# Patient Record
Sex: FEMALE | Race: WHITE | NOT HISPANIC OR LATINO | Employment: OTHER | URBAN - NONMETROPOLITAN AREA
[De-identification: names, ages, dates, MRNs, and addresses within clinical notes are randomized per-mention and may not be internally consistent; named-entity substitution may affect disease eponyms.]

---

## 2018-02-12 ENCOUNTER — OFFICE VISIT CONVERTED (OUTPATIENT)
Dept: FAMILY MEDICINE CLINIC | Age: 83
End: 2018-02-12
Attending: FAMILY MEDICINE

## 2018-09-12 ENCOUNTER — OFFICE VISIT CONVERTED (OUTPATIENT)
Dept: FAMILY MEDICINE CLINIC | Age: 83
End: 2018-09-12
Attending: FAMILY MEDICINE

## 2019-04-23 ENCOUNTER — OFFICE VISIT CONVERTED (OUTPATIENT)
Dept: FAMILY MEDICINE CLINIC | Age: 84
End: 2019-04-23
Attending: FAMILY MEDICINE

## 2019-11-20 ENCOUNTER — OFFICE VISIT CONVERTED (OUTPATIENT)
Dept: FAMILY MEDICINE CLINIC | Age: 84
End: 2019-11-20
Attending: FAMILY MEDICINE

## 2020-01-01 ENCOUNTER — OFFICE VISIT CONVERTED (OUTPATIENT)
Dept: FAMILY MEDICINE CLINIC | Age: 85
End: 2020-01-01
Attending: FAMILY MEDICINE

## 2020-01-01 ENCOUNTER — CONVERSION ENCOUNTER (OUTPATIENT)
Dept: NEUROLOGY | Facility: CLINIC | Age: 85
End: 2020-01-01

## 2020-01-01 ENCOUNTER — HOSPITAL ENCOUNTER (OUTPATIENT)
Dept: OTHER | Facility: HOSPITAL | Age: 85
Discharge: HOME | End: 2020-07-17
Attending: FAMILY MEDICINE

## 2020-01-01 ENCOUNTER — OFFICE VISIT CONVERTED (OUTPATIENT)
Dept: NEUROLOGY | Facility: CLINIC | Age: 85
End: 2020-01-01
Attending: NURSE PRACTITIONER

## 2020-01-07 ENCOUNTER — HOSPITAL ENCOUNTER (OUTPATIENT)
Dept: OTHER | Facility: HOSPITAL | Age: 85
Discharge: HOME | End: 2020-01-07
Attending: FAMILY MEDICINE

## 2020-01-07 LAB
ALBUMIN SERPL-MCNC: 3.9 G/DL (ref 3.5–5)
ALBUMIN/GLOB SERPL: 1.3 {RATIO} (ref 1.4–2.6)
ALP SERPL-CCNC: 70 U/L (ref 38–185)
ALT SERPL-CCNC: 17 U/L (ref 10–40)
ANION GAP SERPL CALC-SCNC: 16 MMOL/L (ref 8–19)
APPEARANCE UR: CLEAR
AST SERPL-CCNC: 29 U/L (ref 15–50)
BACTERIA UR QL AUTO: ABNORMAL
BILIRUB SERPL-MCNC: 0.4 MG/DL (ref 0.2–1.3)
BILIRUB UR QL: NEGATIVE
BNP SERPL-MCNC: 238 PG/ML (ref 0–1800)
BUN SERPL-MCNC: 12 MG/DL (ref 5–25)
BUN/CREAT SERPL: 17 {RATIO} (ref 6–20)
CALCIUM SERPL-MCNC: 9.4 MG/DL (ref 8.7–10.4)
CASTS URNS QL MICRO: ABNORMAL /[LPF]
CHLORIDE SERPL-SCNC: 100 MMOL/L (ref 99–111)
CK SERPL-CCNC: 48 U/L (ref 35–230)
COLOR UR: YELLOW
CONV CO2: 27 MMOL/L (ref 22–32)
CONV LEUKOCYTE ESTERASE: ABNORMAL
CONV TOTAL PROTEIN: 6.9 G/DL (ref 6.3–8.2)
CONV UROBILINOGEN IN URINE BY AUTOMATED TEST STRIP: 0.2 {EHRLICHU}/DL (ref 0.1–1)
CREAT UR-MCNC: 0.7 MG/DL (ref 0.5–0.9)
EPI CELLS #/AREA URNS HPF: ABNORMAL /[HPF]
ERYTHROCYTE [DISTWIDTH] IN BLOOD BY AUTOMATED COUNT: 12.8 % (ref 11.5–14.5)
GFR SERPLBLD BASED ON 1.73 SQ M-ARVRAT: >60 ML/MIN/{1.73_M2}
GLOBULIN UR ELPH-MCNC: 3 G/DL (ref 2–3.5)
GLUCOSE 24H UR-MCNC: NEGATIVE MG/DL
GLUCOSE SERPL-MCNC: 107 MG/DL (ref 65–99)
HBA1C MFR BLD: 13.4 G/DL (ref 12–16)
HCT VFR BLD AUTO: 40.8 % (ref 37–47)
HGB UR QL STRIP: ABNORMAL
KETONES UR QL STRIP: NEGATIVE MG/DL
MCH RBC QN AUTO: 30.8 PG (ref 27–31)
MCHC RBC AUTO-ENTMCNC: 32.8 G/DL (ref 33–37)
MCV RBC AUTO: 93.8 FL (ref 81–99)
MUCOUS THREADS URNS QL MICRO: ABNORMAL
NITRITE UR-MCNC: NEGATIVE MG/ML
OSMOLALITY SERPL CALC.SUM OF ELEC: 288 MOSM/KG (ref 273–304)
PH UR STRIP.AUTO: 6 [PH] (ref 5–8)
PLATELET # BLD AUTO: 144 10*3/UL (ref 130–400)
PMV BLD AUTO: 10.7 FL (ref 7.4–10.4)
POTASSIUM SERPL-SCNC: 4.1 MMOL/L (ref 3.5–5.3)
PROT UR-MCNC: NEGATIVE MG/DL
RBC # BLD AUTO: 4.35 10*6/UL (ref 4.2–5.4)
RBC # BLD AUTO: ABNORMAL /[HPF]
SODIUM SERPL-SCNC: 139 MMOL/L (ref 135–147)
SP GR UR STRIP: <=1.005 (ref 1–1.03)
SPECIMEN SOURCE: ABNORMAL
T4 FREE SERPL-MCNC: 1.2 NG/DL (ref 0.9–1.8)
TSH SERPL-ACNC: 4.31 M[IU]/L (ref 0.27–4.2)
UNIDENT CRYS URNS QL MICRO: ABNORMAL /[HPF]
WBC # BLD AUTO: 4.43 10*3/UL (ref 4.8–10.8)
WBC #/AREA URNS HPF: ABNORMAL /[HPF]

## 2021-01-01 ENCOUNTER — OFFICE VISIT CONVERTED (OUTPATIENT)
Dept: FAMILY MEDICINE CLINIC | Age: 86
End: 2021-01-01
Attending: FAMILY MEDICINE

## 2021-01-01 ENCOUNTER — OFFICE VISIT CONVERTED (OUTPATIENT)
Dept: NEUROLOGY | Facility: CLINIC | Age: 86
End: 2021-01-01
Attending: NURSE PRACTITIONER

## 2021-01-01 ENCOUNTER — APPOINTMENT (OUTPATIENT)
Dept: NEUROLOGY | Facility: HOSPITAL | Age: 86
End: 2021-01-01

## 2021-01-01 ENCOUNTER — HOSPITAL ENCOUNTER (INPATIENT)
Facility: HOSPITAL | Age: 86
LOS: 9 days | End: 2021-03-14
Attending: INTERNAL MEDICINE | Admitting: INTERNAL MEDICINE

## 2021-01-01 ENCOUNTER — HOSPITAL ENCOUNTER (INPATIENT)
Facility: HOSPITAL | Age: 86
LOS: 2 days | Discharge: HOSPICE | End: 2021-03-05
Attending: STUDENT IN AN ORGANIZED HEALTH CARE EDUCATION/TRAINING PROGRAM | Admitting: INTERNAL MEDICINE

## 2021-01-01 ENCOUNTER — APPOINTMENT (OUTPATIENT)
Dept: GENERAL RADIOLOGY | Facility: HOSPITAL | Age: 86
End: 2021-01-01

## 2021-01-01 ENCOUNTER — CONVERSION ENCOUNTER (OUTPATIENT)
Dept: OTHER | Facility: HOSPITAL | Age: 86
End: 2021-01-01

## 2021-01-01 VITALS
SYSTOLIC BLOOD PRESSURE: 161 MMHG | RESPIRATION RATE: 12 BRPM | DIASTOLIC BLOOD PRESSURE: 58 MMHG | OXYGEN SATURATION: 97 % | TEMPERATURE: 96.9 F | HEART RATE: 62 BPM | WEIGHT: 152.78 LBS

## 2021-01-01 VITALS — SYSTOLIC BLOOD PRESSURE: 83 MMHG | OXYGEN SATURATION: 96 % | DIASTOLIC BLOOD PRESSURE: 59 MMHG | TEMPERATURE: 99.3 F

## 2021-01-01 LAB
ALBUMIN SERPL-MCNC: 3.3 G/DL (ref 3.5–5.2)
ALBUMIN SERPL-MCNC: 3.8 G/DL (ref 3.5–5.2)
ALBUMIN/GLOB SERPL: 1.3 G/DL
ALBUMIN/GLOB SERPL: 1.3 G/DL
ALP SERPL-CCNC: 78 U/L (ref 39–117)
ALP SERPL-CCNC: 89 U/L (ref 39–117)
ALT SERPL W P-5'-P-CCNC: 49 U/L (ref 1–33)
ALT SERPL W P-5'-P-CCNC: 51 U/L (ref 1–33)
AMMONIA BLD-SCNC: 16 UMOL/L (ref 11–51)
ANION GAP SERPL CALCULATED.3IONS-SCNC: 10 MMOL/L (ref 5–15)
ANION GAP SERPL CALCULATED.3IONS-SCNC: 11.8 MMOL/L (ref 5–15)
ARTERIAL PATENCY WRIST A: ABNORMAL
AST SERPL-CCNC: 71 U/L (ref 1–32)
AST SERPL-CCNC: 75 U/L (ref 1–32)
ATMOSPHERIC PRESS: 746.8 MMHG
BASE EXCESS BLDA CALC-SCNC: 4.7 MMOL/L (ref 0–2)
BASOPHILS # BLD AUTO: 0.02 10*3/MM3 (ref 0–0.2)
BASOPHILS NFR BLD AUTO: 0.3 % (ref 0–1.5)
BDY SITE: ABNORMAL
BILIRUB SERPL-MCNC: 0.6 MG/DL (ref 0–1.2)
BILIRUB SERPL-MCNC: 0.6 MG/DL (ref 0–1.2)
BILIRUB UR QL STRIP: NEGATIVE
BUN SERPL-MCNC: 11 MG/DL (ref 8–23)
BUN SERPL-MCNC: 21 MG/DL (ref 8–23)
BUN/CREAT SERPL: 14.7 (ref 7–25)
BUN/CREAT SERPL: 30 (ref 7–25)
CALCIUM SPEC-SCNC: 8.2 MG/DL (ref 8.2–9.6)
CALCIUM SPEC-SCNC: 8.9 MG/DL (ref 8.2–9.6)
CHLORIDE SERPL-SCNC: 100 MMOL/L (ref 98–107)
CHLORIDE SERPL-SCNC: 97 MMOL/L (ref 98–107)
CHOLEST SERPL-MCNC: 219 MG/DL (ref 0–200)
CLARITY UR: CLEAR
CO2 SERPL-SCNC: 30 MMOL/L (ref 22–29)
CO2 SERPL-SCNC: 31.2 MMOL/L (ref 22–29)
COLOR UR: YELLOW
CREAT SERPL-MCNC: 0.7 MG/DL (ref 0.57–1)
CREAT SERPL-MCNC: 0.75 MG/DL (ref 0.57–1)
DEPRECATED RDW RBC AUTO: 46.5 FL (ref 37–54)
DEPRECATED RDW RBC AUTO: 48 FL (ref 37–54)
EOSINOPHIL # BLD AUTO: 0 10*3/MM3 (ref 0–0.4)
EOSINOPHIL NFR BLD AUTO: 0 % (ref 0.3–6.2)
ERYTHROCYTE [DISTWIDTH] IN BLOOD BY AUTOMATED COUNT: 14.4 % (ref 12.3–15.4)
ERYTHROCYTE [DISTWIDTH] IN BLOOD BY AUTOMATED COUNT: 14.6 % (ref 12.3–15.4)
GFR SERPL CREATININE-BSD FRML MDRD: 72 ML/MIN/1.73
GFR SERPL CREATININE-BSD FRML MDRD: 78 ML/MIN/1.73
GLOBULIN UR ELPH-MCNC: 2.6 GM/DL
GLOBULIN UR ELPH-MCNC: 2.9 GM/DL
GLUCOSE BLDC GLUCOMTR-MCNC: 101 MG/DL (ref 70–130)
GLUCOSE BLDC GLUCOMTR-MCNC: 108 MG/DL (ref 70–130)
GLUCOSE BLDC GLUCOMTR-MCNC: 72 MG/DL (ref 70–130)
GLUCOSE BLDC GLUCOMTR-MCNC: 73 MG/DL (ref 70–130)
GLUCOSE BLDC GLUCOMTR-MCNC: 74 MG/DL (ref 70–130)
GLUCOSE BLDC GLUCOMTR-MCNC: 79 MG/DL (ref 70–130)
GLUCOSE BLDC GLUCOMTR-MCNC: 82 MG/DL (ref 70–130)
GLUCOSE BLDC GLUCOMTR-MCNC: 92 MG/DL (ref 70–130)
GLUCOSE SERPL-MCNC: 100 MG/DL (ref 65–99)
GLUCOSE SERPL-MCNC: 79 MG/DL (ref 65–99)
GLUCOSE UR STRIP-MCNC: NEGATIVE MG/DL
HBA1C MFR BLD: 5.8 % (ref 4.8–5.6)
HCO3 BLDA-SCNC: 29.5 MMOL/L (ref 22–28)
HCT VFR BLD AUTO: 38.8 % (ref 34–46.6)
HCT VFR BLD AUTO: 44.4 % (ref 34–46.6)
HDLC SERPL-MCNC: 46 MG/DL (ref 40–60)
HGB BLD-MCNC: 13.1 G/DL (ref 12–15.9)
HGB BLD-MCNC: 14.8 G/DL (ref 12–15.9)
HGB UR QL STRIP.AUTO: NEGATIVE
IMM GRANULOCYTES # BLD AUTO: 0.02 10*3/MM3 (ref 0–0.05)
IMM GRANULOCYTES NFR BLD AUTO: 0.3 % (ref 0–0.5)
INR PPP: 1.04 (ref 0.9–1.1)
KETONES UR QL STRIP: ABNORMAL
LDLC SERPL CALC-MCNC: 151 MG/DL (ref 0–100)
LDLC/HDLC SERPL: 3.23 {RATIO}
LEUKOCYTE ESTERASE UR QL STRIP.AUTO: NEGATIVE
LYMPHOCYTES # BLD AUTO: 1.72 10*3/MM3 (ref 0.7–3.1)
LYMPHOCYTES NFR BLD AUTO: 26.4 % (ref 19.6–45.3)
MAGNESIUM SERPL-MCNC: 2 MG/DL (ref 1.7–2.3)
MAGNESIUM SERPL-MCNC: 2 MG/DL (ref 1.7–2.3)
MCH RBC QN AUTO: 30 PG (ref 26.6–33)
MCH RBC QN AUTO: 30.5 PG (ref 26.6–33)
MCHC RBC AUTO-ENTMCNC: 33.3 G/DL (ref 31.5–35.7)
MCHC RBC AUTO-ENTMCNC: 33.8 G/DL (ref 31.5–35.7)
MCV RBC AUTO: 89 FL (ref 79–97)
MCV RBC AUTO: 91.5 FL (ref 79–97)
MODALITY: ABNORMAL
MONOCYTES # BLD AUTO: 1.05 10*3/MM3 (ref 0.1–0.9)
MONOCYTES NFR BLD AUTO: 16.1 % (ref 5–12)
NEUTROPHILS NFR BLD AUTO: 3.71 10*3/MM3 (ref 1.7–7)
NEUTROPHILS NFR BLD AUTO: 56.9 % (ref 42.7–76)
NITRITE UR QL STRIP: NEGATIVE
NRBC BLD AUTO-RTO: 0 /100 WBC (ref 0–0.2)
PCO2 BLDA: 43 MM HG (ref 35–45)
PH BLDA: 7.44 PH UNITS (ref 7.35–7.45)
PH UR STRIP.AUTO: 7.5 [PH] (ref 5–8)
PLATELET # BLD AUTO: 157 10*3/MM3 (ref 140–450)
PLATELET # BLD AUTO: 162 10*3/MM3 (ref 140–450)
PMV BLD AUTO: 10 FL (ref 6–12)
PMV BLD AUTO: 10.3 FL (ref 6–12)
PO2 BLDA: 57.5 MM HG (ref 80–100)
POTASSIUM SERPL-SCNC: 3.3 MMOL/L (ref 3.5–5.2)
POTASSIUM SERPL-SCNC: 3.8 MMOL/L (ref 3.5–5.2)
PROCALCITONIN SERPL-MCNC: 0.09 NG/ML (ref 0–0.25)
PROT SERPL-MCNC: 5.9 G/DL (ref 6–8.5)
PROT SERPL-MCNC: 6.7 G/DL (ref 6–8.5)
PROT UR QL STRIP: NEGATIVE
PROTHROMBIN TIME: 13.4 SECONDS (ref 11.7–14.2)
QT INTERVAL: 439 MS
RBC # BLD AUTO: 4.36 10*6/MM3 (ref 3.77–5.28)
RBC # BLD AUTO: 4.85 10*6/MM3 (ref 3.77–5.28)
RPR SER QL: NORMAL
SAO2 % BLDCOA: 90.4 % (ref 92–99)
SODIUM SERPL-SCNC: 140 MMOL/L (ref 136–145)
SODIUM SERPL-SCNC: 140 MMOL/L (ref 136–145)
SP GR UR STRIP: 1.01 (ref 1–1.03)
TOTAL RATE: 20 BREATHS/MINUTE
TRIGL SERPL-MCNC: 121 MG/DL (ref 0–150)
TROPONIN T SERPL-MCNC: <0.01 NG/ML (ref 0–0.03)
UROBILINOGEN UR QL STRIP: ABNORMAL
VALPROATE SERPL-MCNC: 38 MCG/ML (ref 50–125)
VALPROATE SERPL-MCNC: 77 MCG/ML (ref 50–125)
VALPROATE SERPL-MCNC: 94 MCG/ML (ref 50–125)
VIT B12 BLD-MCNC: 1164 PG/ML (ref 211–946)
VLDLC SERPL-MCNC: 22 MG/DL (ref 5–40)
WBC # BLD AUTO: 6.06 10*3/MM3 (ref 3.4–10.8)
WBC # BLD AUTO: 6.52 10*3/MM3 (ref 3.4–10.8)

## 2021-01-01 PROCEDURE — 25010000002 MORPHINE PER 10 MG: Performed by: INTERNAL MEDICINE

## 2021-01-01 PROCEDURE — 80164 ASSAY DIPROPYLACETIC ACD TOT: CPT | Performed by: NURSE PRACTITIONER

## 2021-01-01 PROCEDURE — 99231 SBSQ HOSP IP/OBS SF/LOW 25: CPT | Performed by: INTERNAL MEDICINE

## 2021-01-01 PROCEDURE — 82140 ASSAY OF AMMONIA: CPT | Performed by: INTERNAL MEDICINE

## 2021-01-01 PROCEDURE — 83036 HEMOGLOBIN GLYCOSYLATED A1C: CPT | Performed by: NURSE PRACTITIONER

## 2021-01-01 PROCEDURE — 25010000003 LEVETIRACETAM IN NACL 0.75% 1000 MG/100ML SOLUTION: Performed by: INTERNAL MEDICINE

## 2021-01-01 PROCEDURE — 80053 COMPREHEN METABOLIC PANEL: CPT | Performed by: INTERNAL MEDICINE

## 2021-01-01 PROCEDURE — 82803 BLOOD GASES ANY COMBINATION: CPT

## 2021-01-01 PROCEDURE — 83735 ASSAY OF MAGNESIUM: CPT | Performed by: NURSE PRACTITIONER

## 2021-01-01 PROCEDURE — 82962 GLUCOSE BLOOD TEST: CPT

## 2021-01-01 PROCEDURE — 84484 ASSAY OF TROPONIN QUANT: CPT | Performed by: NURSE PRACTITIONER

## 2021-01-01 PROCEDURE — 82607 VITAMIN B-12: CPT | Performed by: INTERNAL MEDICINE

## 2021-01-01 PROCEDURE — 25010000002 LORAZEPAM PER 2 MG: Performed by: INTERNAL MEDICINE

## 2021-01-01 PROCEDURE — 25010000002 LEVETRIRACETAM PER 10 MG: Performed by: PSYCHIATRY & NEUROLOGY

## 2021-01-01 PROCEDURE — 80164 ASSAY DIPROPYLACETIC ACD TOT: CPT | Performed by: PSYCHIATRY & NEUROLOGY

## 2021-01-01 PROCEDURE — 85025 COMPLETE CBC W/AUTO DIFF WBC: CPT | Performed by: INTERNAL MEDICINE

## 2021-01-01 PROCEDURE — 93010 ELECTROCARDIOGRAM REPORT: CPT | Performed by: INTERNAL MEDICINE

## 2021-01-01 PROCEDURE — 95816 EEG AWAKE AND DROWSY: CPT | Performed by: PSYCHIATRY & NEUROLOGY

## 2021-01-01 PROCEDURE — 25010000002 FOSPHENYTOIN 500 MG PE/10ML SOLUTION 10 ML VIAL: Performed by: NURSE PRACTITIONER

## 2021-01-01 PROCEDURE — 86592 SYPHILIS TEST NON-TREP QUAL: CPT | Performed by: INTERNAL MEDICINE

## 2021-01-01 PROCEDURE — 99222 1ST HOSP IP/OBS MODERATE 55: CPT | Performed by: INTERNAL MEDICINE

## 2021-01-01 PROCEDURE — 80053 COMPREHEN METABOLIC PANEL: CPT | Performed by: NURSE PRACTITIONER

## 2021-01-01 PROCEDURE — 99233 SBSQ HOSP IP/OBS HIGH 50: CPT | Performed by: NURSE PRACTITIONER

## 2021-01-01 PROCEDURE — 84145 PROCALCITONIN (PCT): CPT | Performed by: NURSE PRACTITIONER

## 2021-01-01 PROCEDURE — 85027 COMPLETE CBC AUTOMATED: CPT | Performed by: NURSE PRACTITIONER

## 2021-01-01 PROCEDURE — 81003 URINALYSIS AUTO W/O SCOPE: CPT | Performed by: INTERNAL MEDICINE

## 2021-01-01 PROCEDURE — 85610 PROTHROMBIN TIME: CPT | Performed by: NURSE PRACTITIONER

## 2021-01-01 PROCEDURE — 25010000002 LORAZEPAM PER 2 MG: Performed by: PSYCHIATRY & NEUROLOGY

## 2021-01-01 PROCEDURE — 25810000003 SODIUM CHLORIDE 0.9 % WITH KCL 20 MEQ 20-0.9 MEQ/L-% SOLUTION: Performed by: INTERNAL MEDICINE

## 2021-01-01 PROCEDURE — 36600 WITHDRAWAL OF ARTERIAL BLOOD: CPT

## 2021-01-01 PROCEDURE — 93005 ELECTROCARDIOGRAM TRACING: CPT | Performed by: NURSE PRACTITIONER

## 2021-01-01 PROCEDURE — 25010000003 LEVETIRACETAM IN NACL 0.75% 1000 MG/100ML SOLUTION: Performed by: NURSE PRACTITIONER

## 2021-01-01 PROCEDURE — 83735 ASSAY OF MAGNESIUM: CPT | Performed by: INTERNAL MEDICINE

## 2021-01-01 PROCEDURE — 80061 LIPID PANEL: CPT | Performed by: NURSE PRACTITIONER

## 2021-01-01 PROCEDURE — 95720 EEG PHY/QHP EA INCR W/VEEG: CPT | Performed by: PSYCHIATRY & NEUROLOGY

## 2021-01-01 PROCEDURE — 95716 VEEG EA 12-26HR CONT MNTR: CPT

## 2021-01-01 PROCEDURE — 25010000003 POTASSIUM CHLORIDE 10 MEQ/100ML SOLUTION: Performed by: INTERNAL MEDICINE

## 2021-01-01 PROCEDURE — 71045 X-RAY EXAM CHEST 1 VIEW: CPT

## 2021-01-01 PROCEDURE — 95816 EEG AWAKE AND DROWSY: CPT

## 2021-01-01 PROCEDURE — 99222 1ST HOSP IP/OBS MODERATE 55: CPT | Performed by: PSYCHIATRY & NEUROLOGY

## 2021-01-01 PROCEDURE — 25010000002 LEVETIRACETAM IN NACL 0.82% 500 MG/100ML SOLUTION: Performed by: NURSE PRACTITIONER

## 2021-01-01 RX ORDER — MORPHINE SULFATE 2 MG/ML
1 INJECTION, SOLUTION INTRAMUSCULAR; INTRAVENOUS
Status: DISCONTINUED | OUTPATIENT
Start: 2021-01-01 | End: 2021-01-01 | Stop reason: HOSPADM

## 2021-01-01 RX ORDER — POLYETHYLENE GLYCOL 3350 17 G/17G
17 POWDER, FOR SOLUTION ORAL DAILY
Status: DISCONTINUED | OUTPATIENT
Start: 2021-01-01 | End: 2021-01-01

## 2021-01-01 RX ORDER — MORPHINE SULFATE 20 MG/ML
5 SOLUTION ORAL
Status: DISCONTINUED | OUTPATIENT
Start: 2021-01-01 | End: 2021-01-01 | Stop reason: HOSPADM

## 2021-01-01 RX ORDER — DONEPEZIL HYDROCHLORIDE 10 MG/1
10 TABLET, FILM COATED ORAL NIGHTLY
COMMUNITY
Start: 2021-01-01

## 2021-01-01 RX ORDER — ARIPIPRAZOLE 2 MG/1
2 TABLET ORAL DAILY
Status: DISCONTINUED | OUTPATIENT
Start: 2021-01-01 | End: 2021-01-01

## 2021-01-01 RX ORDER — GLYCOPYRROLATE 0.2 MG/ML
0.2 INJECTION INTRAMUSCULAR; INTRAVENOUS
Status: DISCONTINUED | OUTPATIENT
Start: 2021-01-01 | End: 2021-01-01 | Stop reason: HOSPADM

## 2021-01-01 RX ORDER — MULTIPLE VITAMINS W/ MINERALS TAB 9MG-400MCG
1 TAB ORAL DAILY
COMMUNITY

## 2021-01-01 RX ORDER — ACETAMINOPHEN 160 MG/5ML
650 SOLUTION ORAL EVERY 4 HOURS PRN
Status: DISCONTINUED | OUTPATIENT
Start: 2021-01-01 | End: 2021-01-01 | Stop reason: HOSPADM

## 2021-01-01 RX ORDER — POTASSIUM CHLORIDE 7.45 MG/ML
10 INJECTION INTRAVENOUS
Status: COMPLETED | OUTPATIENT
Start: 2021-01-01 | End: 2021-01-01

## 2021-01-01 RX ORDER — LEVETIRACETAM 10 MG/ML
1000 INJECTION INTRAVASCULAR EVERY 12 HOURS SCHEDULED
Status: DISPENSED | OUTPATIENT
Start: 2021-01-01 | End: 2021-01-01

## 2021-01-01 RX ORDER — DIPHENOXYLATE HYDROCHLORIDE AND ATROPINE SULFATE 2.5; .025 MG/1; MG/1
1 TABLET ORAL
Status: DISCONTINUED | OUTPATIENT
Start: 2021-01-01 | End: 2021-01-01 | Stop reason: HOSPADM

## 2021-01-01 RX ORDER — ENALAPRILAT 2.5 MG/2ML
0.62 INJECTION INTRAVENOUS ONCE
Status: DISCONTINUED | OUTPATIENT
Start: 2021-01-01 | End: 2021-01-01

## 2021-01-01 RX ORDER — ACETAMINOPHEN 325 MG/1
650 TABLET ORAL EVERY 4 HOURS PRN
Status: DISCONTINUED | OUTPATIENT
Start: 2021-01-01 | End: 2021-01-01 | Stop reason: HOSPADM

## 2021-01-01 RX ORDER — LORAZEPAM 2 MG/ML
0.5 INJECTION INTRAMUSCULAR
Status: DISCONTINUED | OUTPATIENT
Start: 2021-01-01 | End: 2021-01-01 | Stop reason: HOSPADM

## 2021-01-01 RX ORDER — LORAZEPAM 2 MG/ML
2 INJECTION INTRAMUSCULAR ONCE
Status: COMPLETED | OUTPATIENT
Start: 2021-01-01 | End: 2021-01-01

## 2021-01-01 RX ORDER — LEVETIRACETAM 5 MG/ML
500 INJECTION INTRAVASCULAR EVERY 12 HOURS SCHEDULED
Status: DISCONTINUED | OUTPATIENT
Start: 2021-01-01 | End: 2021-01-01

## 2021-01-01 RX ORDER — NADOLOL 20 MG/1
40 TABLET ORAL DAILY
Status: DISCONTINUED | OUTPATIENT
Start: 2021-01-01 | End: 2021-01-01

## 2021-01-01 RX ORDER — LORAZEPAM 2 MG/ML
0.5 CONCENTRATE ORAL
Status: ACTIVE | OUTPATIENT
Start: 2021-01-01 | End: 2021-01-01

## 2021-01-01 RX ORDER — SCOLOPAMINE TRANSDERMAL SYSTEM 1 MG/1
1 PATCH, EXTENDED RELEASE TRANSDERMAL
Status: DISCONTINUED | OUTPATIENT
Start: 2021-01-01 | End: 2021-01-01 | Stop reason: HOSPADM

## 2021-01-01 RX ORDER — ENALAPRILAT 2.5 MG/2ML
0.62 INJECTION INTRAVENOUS EVERY 4 HOURS PRN
Status: DISCONTINUED | OUTPATIENT
Start: 2021-01-01 | End: 2021-01-01 | Stop reason: HOSPADM

## 2021-01-01 RX ORDER — SERTRALINE HYDROCHLORIDE 100 MG/1
100 TABLET, FILM COATED ORAL DAILY
COMMUNITY

## 2021-01-01 RX ORDER — MORPHINE SULFATE 20 MG/ML
20 SOLUTION ORAL
Status: DISCONTINUED | OUTPATIENT
Start: 2021-01-01 | End: 2021-01-01 | Stop reason: HOSPADM

## 2021-01-01 RX ORDER — GLYCOPYRROLATE 0.2 MG/ML
0.4 INJECTION INTRAMUSCULAR; INTRAVENOUS
Status: DISCONTINUED | OUTPATIENT
Start: 2021-01-01 | End: 2021-01-01

## 2021-01-01 RX ORDER — DIVALPROEX SODIUM 125 MG/1
500 CAPSULE, COATED PELLETS ORAL 3 TIMES DAILY
Status: DISCONTINUED | OUTPATIENT
Start: 2021-01-01 | End: 2021-01-01

## 2021-01-01 RX ORDER — MIRTAZAPINE 30 MG/1
30 TABLET, FILM COATED ORAL NIGHTLY
COMMUNITY

## 2021-01-01 RX ORDER — MORPHINE SULFATE 2 MG/ML
2 INJECTION, SOLUTION INTRAMUSCULAR; INTRAVENOUS
Status: DISCONTINUED | OUTPATIENT
Start: 2021-01-01 | End: 2021-01-01 | Stop reason: HOSPADM

## 2021-01-01 RX ORDER — LORAZEPAM 2 MG/ML
2 INJECTION INTRAMUSCULAR
Status: DISCONTINUED | OUTPATIENT
Start: 2021-01-01 | End: 2021-01-01

## 2021-01-01 RX ORDER — NADOLOL 40 MG/1
40 TABLET ORAL DAILY
COMMUNITY

## 2021-01-01 RX ORDER — ACETAMINOPHEN 650 MG/1
650 SUPPOSITORY RECTAL EVERY 4 HOURS PRN
Status: DISCONTINUED | OUTPATIENT
Start: 2021-01-01 | End: 2021-01-01 | Stop reason: SDUPTHER

## 2021-01-01 RX ORDER — ACETAMINOPHEN 650 MG/1
650 SUPPOSITORY RECTAL EVERY 4 HOURS PRN
Status: DISCONTINUED | OUTPATIENT
Start: 2021-01-01 | End: 2021-01-01 | Stop reason: HOSPADM

## 2021-01-01 RX ORDER — POTASSIUM CHLORIDE 750 MG/1
40 TABLET, FILM COATED, EXTENDED RELEASE ORAL AS NEEDED
Status: DISCONTINUED | OUTPATIENT
Start: 2021-01-01 | End: 2021-01-01

## 2021-01-01 RX ORDER — DONEPEZIL HYDROCHLORIDE 5 MG/1
5 TABLET, FILM COATED ORAL NIGHTLY
COMMUNITY
Start: 2021-01-01 | End: 2021-01-01

## 2021-01-01 RX ORDER — HYDROMORPHONE HYDROCHLORIDE 1 MG/ML
0.25 INJECTION, SOLUTION INTRAMUSCULAR; INTRAVENOUS; SUBCUTANEOUS
Status: DISCONTINUED | OUTPATIENT
Start: 2021-01-01 | End: 2021-01-01

## 2021-01-01 RX ORDER — ASPIRIN 300 MG/1
300 SUPPOSITORY RECTAL DAILY
Status: DISCONTINUED | OUTPATIENT
Start: 2021-01-01 | End: 2021-01-01

## 2021-01-01 RX ORDER — HYDROMORPHONE HCL 110MG/55ML
1.5 PATIENT CONTROLLED ANALGESIA SYRINGE INTRAVENOUS
Status: DISCONTINUED | OUTPATIENT
Start: 2021-01-01 | End: 2021-01-01 | Stop reason: HOSPADM

## 2021-01-01 RX ORDER — POTASSIUM CHLORIDE 1.5 G/1.77G
40 POWDER, FOR SOLUTION ORAL AS NEEDED
Status: DISCONTINUED | OUTPATIENT
Start: 2021-01-01 | End: 2021-01-01

## 2021-01-01 RX ORDER — LORAZEPAM 2 MG/ML
0.5 INJECTION INTRAMUSCULAR
Status: DISCONTINUED | OUTPATIENT
Start: 2021-01-01 | End: 2021-01-01

## 2021-01-01 RX ORDER — KETOROLAC TROMETHAMINE 30 MG/ML
15 INJECTION, SOLUTION INTRAMUSCULAR; INTRAVENOUS EVERY 6 HOURS PRN
Status: DISCONTINUED | OUTPATIENT
Start: 2021-01-01 | End: 2021-01-01

## 2021-01-01 RX ORDER — MORPHINE SULFATE 2 MG/ML
1 INJECTION, SOLUTION INTRAMUSCULAR; INTRAVENOUS
Status: DISCONTINUED | OUTPATIENT
Start: 2021-01-01 | End: 2021-01-01

## 2021-01-01 RX ORDER — MULTIPLE VITAMINS W/ MINERALS TAB 9MG-400MCG
1 TAB ORAL DAILY
Status: DISCONTINUED | OUTPATIENT
Start: 2021-01-01 | End: 2021-01-01

## 2021-01-01 RX ORDER — LORAZEPAM 2 MG/ML
1 INJECTION INTRAMUSCULAR ONCE
Status: COMPLETED | OUTPATIENT
Start: 2021-01-01 | End: 2021-01-01

## 2021-01-01 RX ORDER — ASPIRIN 325 MG
325 TABLET ORAL DAILY
Status: DISCONTINUED | OUTPATIENT
Start: 2021-01-01 | End: 2021-01-01

## 2021-01-01 RX ORDER — GLYCOPYRROLATE 0.2 MG/ML
0.4 INJECTION INTRAMUSCULAR; INTRAVENOUS
Status: DISCONTINUED | OUTPATIENT
Start: 2021-01-01 | End: 2021-01-01 | Stop reason: HOSPADM

## 2021-01-01 RX ORDER — LORAZEPAM 2 MG/ML
0.5 CONCENTRATE ORAL
Status: DISCONTINUED | OUTPATIENT
Start: 2021-01-01 | End: 2021-01-01 | Stop reason: HOSPADM

## 2021-01-01 RX ORDER — LORAZEPAM 2 MG/ML
0.5 INJECTION INTRAMUSCULAR
Status: DISPENSED | OUTPATIENT
Start: 2021-01-01 | End: 2021-01-01

## 2021-01-01 RX ORDER — LORAZEPAM 2 MG/ML
1 INJECTION INTRAMUSCULAR
Status: DISCONTINUED | OUTPATIENT
Start: 2021-01-01 | End: 2021-01-01 | Stop reason: HOSPADM

## 2021-01-01 RX ORDER — SODIUM CHLORIDE AND POTASSIUM CHLORIDE 150; 900 MG/100ML; MG/100ML
50 INJECTION, SOLUTION INTRAVENOUS CONTINUOUS
Status: DISCONTINUED | OUTPATIENT
Start: 2021-01-01 | End: 2021-01-01 | Stop reason: HOSPADM

## 2021-01-01 RX ORDER — ARIPIPRAZOLE 2 MG/1
2 TABLET ORAL DAILY
COMMUNITY

## 2021-01-01 RX ORDER — ATORVASTATIN CALCIUM 20 MG/1
40 TABLET, FILM COATED ORAL NIGHTLY
Status: DISCONTINUED | OUTPATIENT
Start: 2021-01-01 | End: 2021-01-01

## 2021-01-01 RX ORDER — ACETAMINOPHEN 325 MG/1
650 TABLET ORAL EVERY 4 HOURS PRN
Status: DISCONTINUED | OUTPATIENT
Start: 2021-01-01 | End: 2021-01-01

## 2021-01-01 RX ORDER — MIRTAZAPINE 30 MG/1
30 TABLET, FILM COATED ORAL NIGHTLY
Status: DISCONTINUED | OUTPATIENT
Start: 2021-01-01 | End: 2021-01-01

## 2021-01-01 RX ORDER — MELATONIN
1000 DAILY
Status: DISCONTINUED | OUTPATIENT
Start: 2021-01-01 | End: 2021-01-01

## 2021-01-01 RX ORDER — LORAZEPAM 2 MG/ML
2 CONCENTRATE ORAL
Status: ACTIVE | OUTPATIENT
Start: 2021-01-01 | End: 2021-01-01

## 2021-01-01 RX ORDER — DIVALPROEX SODIUM 250 MG/1
250 TABLET, DELAYED RELEASE ORAL 2 TIMES DAILY
COMMUNITY

## 2021-01-01 RX ORDER — ONDANSETRON 2 MG/ML
4 INJECTION INTRAMUSCULAR; INTRAVENOUS EVERY 6 HOURS PRN
Status: DISCONTINUED | OUTPATIENT
Start: 2021-01-01 | End: 2021-01-01 | Stop reason: HOSPADM

## 2021-01-01 RX ORDER — GLYCOPYRROLATE 0.2 MG/ML
0.2 INJECTION INTRAMUSCULAR; INTRAVENOUS
Status: DISCONTINUED | OUTPATIENT
Start: 2021-01-01 | End: 2021-01-01

## 2021-01-01 RX ORDER — POLYETHYLENE GLYCOL 3350 17 G/17G
17 POWDER, FOR SOLUTION ORAL DAILY
COMMUNITY

## 2021-01-01 RX ORDER — AMLODIPINE BESYLATE 5 MG/1
5 TABLET ORAL DAILY
COMMUNITY

## 2021-01-01 RX ORDER — DIPHENOXYLATE HYDROCHLORIDE AND ATROPINE SULFATE 2.5; .025 MG/1; MG/1
1 TABLET ORAL
Status: DISCONTINUED | OUTPATIENT
Start: 2021-01-01 | End: 2021-01-01

## 2021-01-01 RX ORDER — MELATONIN
1000 DAILY
COMMUNITY

## 2021-01-01 RX ORDER — MORPHINE SULFATE 10 MG/ML
6 INJECTION INTRAMUSCULAR; INTRAVENOUS; SUBCUTANEOUS
Status: DISCONTINUED | OUTPATIENT
Start: 2021-01-01 | End: 2021-01-01 | Stop reason: HOSPADM

## 2021-01-01 RX ORDER — LEVETIRACETAM 10 MG/ML
1000 INJECTION INTRAVASCULAR EVERY 12 HOURS SCHEDULED
Status: DISCONTINUED | OUTPATIENT
Start: 2021-01-01 | End: 2021-01-01 | Stop reason: HOSPADM

## 2021-01-01 RX ORDER — LEVETIRACETAM 250 MG/1
250 TABLET ORAL 2 TIMES DAILY
COMMUNITY

## 2021-01-01 RX ORDER — MORPHINE SULFATE 20 MG/ML
10 SOLUTION ORAL
Status: DISCONTINUED | OUTPATIENT
Start: 2021-01-01 | End: 2021-01-01 | Stop reason: HOSPADM

## 2021-01-01 RX ORDER — SODIUM CHLORIDE 0.9 % (FLUSH) 0.9 %
10 SYRINGE (ML) INJECTION AS NEEDED
Status: DISCONTINUED | OUTPATIENT
Start: 2021-01-01 | End: 2021-01-01 | Stop reason: HOSPADM

## 2021-01-01 RX ORDER — ASPIRIN 81 MG/1
81 TABLET, CHEWABLE ORAL DAILY
COMMUNITY

## 2021-01-01 RX ORDER — POTASSIUM CHLORIDE 7.45 MG/ML
10 INJECTION INTRAVENOUS
Status: DISCONTINUED | OUTPATIENT
Start: 2021-01-01 | End: 2021-01-01

## 2021-01-01 RX ORDER — ACETAMINOPHEN 160 MG/5ML
650 SOLUTION ORAL EVERY 4 HOURS PRN
Status: DISCONTINUED | OUTPATIENT
Start: 2021-01-01 | End: 2021-01-01

## 2021-01-01 RX ORDER — LORAZEPAM 2 MG/ML
1 CONCENTRATE ORAL
Status: ACTIVE | OUTPATIENT
Start: 2021-01-01 | End: 2021-01-01

## 2021-01-01 RX ORDER — ATORVASTATIN CALCIUM 80 MG/1
80 TABLET, FILM COATED ORAL NIGHTLY
Status: DISCONTINUED | OUTPATIENT
Start: 2021-01-01 | End: 2021-01-01

## 2021-01-01 RX ORDER — LORAZEPAM 2 MG/ML
1 INJECTION INTRAMUSCULAR
Status: DISPENSED | OUTPATIENT
Start: 2021-01-01 | End: 2021-01-01

## 2021-01-01 RX ORDER — MORPHINE SULFATE 20 MG/ML
5 SOLUTION ORAL
Status: DISCONTINUED | OUTPATIENT
Start: 2021-01-01 | End: 2021-01-01

## 2021-01-01 RX ORDER — LORAZEPAM 2 MG/ML
1 INJECTION INTRAMUSCULAR
Status: DISCONTINUED | OUTPATIENT
Start: 2021-01-01 | End: 2021-01-01

## 2021-01-01 RX ORDER — LORAZEPAM 2 MG/ML
2 INJECTION INTRAMUSCULAR
Status: DISCONTINUED | OUTPATIENT
Start: 2021-01-01 | End: 2021-01-01 | Stop reason: HOSPADM

## 2021-01-01 RX ORDER — MORPHINE SULFATE 4 MG/ML
4 INJECTION, SOLUTION INTRAMUSCULAR; INTRAVENOUS
Status: DISCONTINUED | OUTPATIENT
Start: 2021-01-01 | End: 2021-01-01 | Stop reason: HOSPADM

## 2021-01-01 RX ORDER — HYDRALAZINE HYDROCHLORIDE 20 MG/ML
5 INJECTION INTRAMUSCULAR; INTRAVENOUS
Status: DISCONTINUED | OUTPATIENT
Start: 2021-01-01 | End: 2021-01-01

## 2021-01-01 RX ORDER — SERTRALINE HYDROCHLORIDE 100 MG/1
100 TABLET, FILM COATED ORAL DAILY
Status: DISCONTINUED | OUTPATIENT
Start: 2021-01-01 | End: 2021-01-01

## 2021-01-01 RX ORDER — LORAZEPAM 2 MG/ML
2 CONCENTRATE ORAL
Status: DISCONTINUED | OUTPATIENT
Start: 2021-01-01 | End: 2021-01-01 | Stop reason: HOSPADM

## 2021-01-01 RX ORDER — LORAZEPAM 2 MG/ML
1 CONCENTRATE ORAL
Status: DISCONTINUED | OUTPATIENT
Start: 2021-01-01 | End: 2021-01-01 | Stop reason: HOSPADM

## 2021-01-01 RX ORDER — LORAZEPAM 2 MG/ML
2 INJECTION INTRAMUSCULAR
Status: DISPENSED | OUTPATIENT
Start: 2021-01-01 | End: 2021-01-01

## 2021-01-01 RX ORDER — DONEPEZIL HYDROCHLORIDE 5 MG/1
5 TABLET, FILM COATED ORAL NIGHTLY
Status: DISCONTINUED | OUTPATIENT
Start: 2021-01-01 | End: 2021-01-01

## 2021-01-01 RX ORDER — SODIUM CHLORIDE 0.9 % (FLUSH) 0.9 %
10 SYRINGE (ML) INJECTION EVERY 12 HOURS SCHEDULED
Status: DISCONTINUED | OUTPATIENT
Start: 2021-01-01 | End: 2021-01-01 | Stop reason: HOSPADM

## 2021-01-01 RX ADMIN — LORAZEPAM 0.5 MG: 2 INJECTION INTRAMUSCULAR; INTRAVENOUS at 04:33

## 2021-01-01 RX ADMIN — SODIUM CHLORIDE, PRESERVATIVE FREE 10 ML: 5 INJECTION INTRAVENOUS at 21:13

## 2021-01-01 RX ADMIN — GLYCOPYRROLATE 0.4 MG: 0.2 INJECTION INTRAMUSCULAR; INTRAVENOUS at 20:48

## 2021-01-01 RX ADMIN — GLYCOPYRROLATE 0.4 MG: 0.2 INJECTION INTRAMUSCULAR; INTRAVENOUS at 08:07

## 2021-01-01 RX ADMIN — SCOPALAMINE 1 PATCH: 1 PATCH, EXTENDED RELEASE TRANSDERMAL at 01:11

## 2021-01-01 RX ADMIN — MORPHINE SULFATE 2 MG: 2 INJECTION, SOLUTION INTRAMUSCULAR; INTRAVENOUS at 08:47

## 2021-01-01 RX ADMIN — LORAZEPAM 1 MG: 2 INJECTION INTRAMUSCULAR; INTRAVENOUS at 20:14

## 2021-01-01 RX ADMIN — MORPHINE SULFATE 4 MG: 4 INJECTION, SOLUTION INTRAMUSCULAR; INTRAVENOUS at 16:32

## 2021-01-01 RX ADMIN — GLYCOPYRROLATE 0.4 MG: 0.2 INJECTION INTRAMUSCULAR; INTRAVENOUS at 12:48

## 2021-01-01 RX ADMIN — GLYCOPYRROLATE 0.4 MG: 0.2 INJECTION INTRAMUSCULAR; INTRAVENOUS at 16:33

## 2021-01-01 RX ADMIN — LEVETIRACETAM 1000 MG: 10 INJECTION INTRAVENOUS at 08:02

## 2021-01-01 RX ADMIN — GLYCOPYRROLATE 0.4 MG: 0.2 INJECTION INTRAMUSCULAR; INTRAVENOUS at 17:25

## 2021-01-01 RX ADMIN — LORAZEPAM 0.5 MG: 2 INJECTION INTRAMUSCULAR; INTRAVENOUS at 12:54

## 2021-01-01 RX ADMIN — LORAZEPAM 0.5 MG: 2 INJECTION INTRAMUSCULAR; INTRAVENOUS at 00:40

## 2021-01-01 RX ADMIN — LORAZEPAM 2 MG: 2 INJECTION INTRAMUSCULAR; INTRAVENOUS at 04:33

## 2021-01-01 RX ADMIN — SODIUM CHLORIDE 1386 MG PE: 9 INJECTION, SOLUTION INTRAVENOUS at 13:05

## 2021-01-01 RX ADMIN — POTASSIUM CHLORIDE AND SODIUM CHLORIDE 50 ML/HR: 900; 150 INJECTION, SOLUTION INTRAVENOUS at 16:38

## 2021-01-01 RX ADMIN — MORPHINE SULFATE 4 MG: 4 INJECTION, SOLUTION INTRAMUSCULAR; INTRAVENOUS at 20:14

## 2021-01-01 RX ADMIN — MORPHINE SULFATE 4 MG: 4 INJECTION, SOLUTION INTRAMUSCULAR; INTRAVENOUS at 00:28

## 2021-01-01 RX ADMIN — MORPHINE SULFATE 2 MG: 2 INJECTION, SOLUTION INTRAMUSCULAR; INTRAVENOUS at 12:55

## 2021-01-01 RX ADMIN — LEVETIRACETAM 1000 MG: 10 INJECTION INTRAVENOUS at 08:13

## 2021-01-01 RX ADMIN — SODIUM CHLORIDE 500 MG: 9 INJECTION, SOLUTION INTRAVENOUS at 16:48

## 2021-01-01 RX ADMIN — LEVETIRACETAM 1000 MG: 10 INJECTION INTRAVENOUS at 20:56

## 2021-01-01 RX ADMIN — MORPHINE SULFATE 4 MG: 4 INJECTION, SOLUTION INTRAMUSCULAR; INTRAVENOUS at 04:39

## 2021-01-01 RX ADMIN — GLYCOPYRROLATE 0.2 MG: 0.2 INJECTION INTRAMUSCULAR; INTRAVENOUS at 20:48

## 2021-01-01 RX ADMIN — GLYCOPYRROLATE 0.4 MG: 0.2 INJECTION INTRAMUSCULAR; INTRAVENOUS at 04:39

## 2021-01-01 RX ADMIN — LORAZEPAM 0.5 MG: 2 INJECTION INTRAMUSCULAR; INTRAVENOUS at 08:13

## 2021-01-01 RX ADMIN — MORPHINE SULFATE 2 MG: 2 INJECTION, SOLUTION INTRAMUSCULAR; INTRAVENOUS at 08:13

## 2021-01-01 RX ADMIN — LEVETIRACETAM 750 MG: 100 INJECTION, SOLUTION INTRAVENOUS at 09:08

## 2021-01-01 RX ADMIN — MORPHINE SULFATE 4 MG: 4 INJECTION, SOLUTION INTRAMUSCULAR; INTRAVENOUS at 08:56

## 2021-01-01 RX ADMIN — LORAZEPAM 2 MG: 2 INJECTION INTRAMUSCULAR; INTRAVENOUS at 08:42

## 2021-01-01 RX ADMIN — ASPIRIN 300 MG: 300 SUPPOSITORY RECTAL at 13:27

## 2021-01-01 RX ADMIN — LORAZEPAM 0.5 MG: 2 INJECTION INTRAMUSCULAR; INTRAVENOUS at 09:03

## 2021-01-01 RX ADMIN — MORPHINE SULFATE 4 MG: 4 INJECTION, SOLUTION INTRAMUSCULAR; INTRAVENOUS at 00:46

## 2021-01-01 RX ADMIN — MORPHINE SULFATE 2 MG: 2 INJECTION, SOLUTION INTRAMUSCULAR; INTRAVENOUS at 12:43

## 2021-01-01 RX ADMIN — SODIUM CHLORIDE, PRESERVATIVE FREE 10 ML: 5 INJECTION INTRAVENOUS at 09:58

## 2021-01-01 RX ADMIN — MORPHINE SULFATE 4 MG: 4 INJECTION, SOLUTION INTRAMUSCULAR; INTRAVENOUS at 20:54

## 2021-01-01 RX ADMIN — LORAZEPAM 0.5 MG: 2 INJECTION INTRAMUSCULAR; INTRAVENOUS at 01:02

## 2021-01-01 RX ADMIN — LEVETIRACETAM 1000 MG: 10 INJECTION INTRAVENOUS at 20:13

## 2021-01-01 RX ADMIN — LORAZEPAM 0.5 MG: 2 INJECTION INTRAMUSCULAR; INTRAVENOUS at 05:32

## 2021-01-01 RX ADMIN — GLYCOPYRROLATE 0.4 MG: 0.2 INJECTION INTRAMUSCULAR; INTRAVENOUS at 08:30

## 2021-01-01 RX ADMIN — LEVETIRACETAM 1000 MG: 10 INJECTION INTRAVENOUS at 08:57

## 2021-01-01 RX ADMIN — LORAZEPAM 2 MG: 2 INJECTION INTRAMUSCULAR; INTRAVENOUS at 16:56

## 2021-01-01 RX ADMIN — GLYCOPYRROLATE 0.4 MG: 0.2 INJECTION INTRAMUSCULAR; INTRAVENOUS at 05:32

## 2021-01-01 RX ADMIN — LORAZEPAM 2 MG: 2 INJECTION INTRAMUSCULAR; INTRAVENOUS at 04:22

## 2021-01-01 RX ADMIN — MORPHINE SULFATE 2 MG: 2 INJECTION, SOLUTION INTRAMUSCULAR; INTRAVENOUS at 01:02

## 2021-01-01 RX ADMIN — GLYCOPYRROLATE 0.4 MG: 0.2 INJECTION INTRAMUSCULAR; INTRAVENOUS at 20:13

## 2021-01-01 RX ADMIN — LORAZEPAM 2 MG: 2 INJECTION INTRAMUSCULAR; INTRAVENOUS at 00:46

## 2021-01-01 RX ADMIN — MORPHINE SULFATE 2 MG: 2 INJECTION, SOLUTION INTRAMUSCULAR; INTRAVENOUS at 04:59

## 2021-01-01 RX ADMIN — MORPHINE SULFATE 4 MG: 4 INJECTION, SOLUTION INTRAMUSCULAR; INTRAVENOUS at 20:45

## 2021-01-01 RX ADMIN — LORAZEPAM 0.5 MG: 2 INJECTION INTRAMUSCULAR; INTRAVENOUS at 08:47

## 2021-01-01 RX ADMIN — SODIUM CHLORIDE 2000 MG: 9 INJECTION, SOLUTION INTRAVENOUS at 17:09

## 2021-01-01 RX ADMIN — MORPHINE SULFATE 4 MG: 4 INJECTION, SOLUTION INTRAMUSCULAR; INTRAVENOUS at 17:17

## 2021-01-01 RX ADMIN — LORAZEPAM 0.5 MG: 2 INJECTION INTRAMUSCULAR; INTRAVENOUS at 16:54

## 2021-01-01 RX ADMIN — GLYCOPYRROLATE 0.4 MG: 0.2 INJECTION INTRAMUSCULAR; INTRAVENOUS at 16:56

## 2021-01-01 RX ADMIN — MORPHINE SULFATE 4 MG: 4 INJECTION, SOLUTION INTRAMUSCULAR; INTRAVENOUS at 13:05

## 2021-01-01 RX ADMIN — LORAZEPAM 2 MG: 2 INJECTION INTRAMUSCULAR; INTRAVENOUS at 08:07

## 2021-01-01 RX ADMIN — LORAZEPAM 0.5 MG: 2 INJECTION INTRAMUSCULAR; INTRAVENOUS at 20:48

## 2021-01-01 RX ADMIN — MORPHINE SULFATE 4 MG: 4 INJECTION, SOLUTION INTRAMUSCULAR; INTRAVENOUS at 08:42

## 2021-01-01 RX ADMIN — MORPHINE SULFATE 4 MG: 4 INJECTION, SOLUTION INTRAMUSCULAR; INTRAVENOUS at 04:22

## 2021-01-01 RX ADMIN — MORPHINE SULFATE 2 MG: 2 INJECTION, SOLUTION INTRAMUSCULAR; INTRAVENOUS at 20:49

## 2021-01-01 RX ADMIN — LEVETIRACETAM 1000 MG: 10 INJECTION INTRAVENOUS at 09:58

## 2021-01-01 RX ADMIN — GLYCOPYRROLATE 0.4 MG: 0.2 INJECTION INTRAMUSCULAR; INTRAVENOUS at 08:56

## 2021-01-01 RX ADMIN — MORPHINE SULFATE 2 MG: 2 INJECTION, SOLUTION INTRAMUSCULAR; INTRAVENOUS at 16:55

## 2021-01-01 RX ADMIN — GLYCOPYRROLATE 0.4 MG: 0.2 INJECTION INTRAMUSCULAR; INTRAVENOUS at 04:22

## 2021-01-01 RX ADMIN — LORAZEPAM 2 MG: 2 INJECTION INTRAMUSCULAR; INTRAVENOUS at 00:27

## 2021-01-01 RX ADMIN — LORAZEPAM 0.5 MG: 2 INJECTION INTRAMUSCULAR; INTRAVENOUS at 20:56

## 2021-01-01 RX ADMIN — MORPHINE SULFATE 2 MG: 2 INJECTION, SOLUTION INTRAMUSCULAR; INTRAVENOUS at 20:51

## 2021-01-01 RX ADMIN — GLYCOPYRROLATE 0.4 MG: 0.2 INJECTION INTRAMUSCULAR; INTRAVENOUS at 00:27

## 2021-01-01 RX ADMIN — MORPHINE SULFATE 4 MG: 4 INJECTION, SOLUTION INTRAMUSCULAR; INTRAVENOUS at 20:47

## 2021-01-01 RX ADMIN — MORPHINE SULFATE 2 MG: 2 INJECTION, SOLUTION INTRAMUSCULAR; INTRAVENOUS at 00:31

## 2021-01-01 RX ADMIN — MORPHINE SULFATE 2 MG: 2 INJECTION, SOLUTION INTRAMUSCULAR; INTRAVENOUS at 05:43

## 2021-01-01 RX ADMIN — LORAZEPAM 2 MG: 2 INJECTION INTRAMUSCULAR; INTRAVENOUS at 17:25

## 2021-01-01 RX ADMIN — LORAZEPAM 0.5 MG: 2 INJECTION INTRAMUSCULAR; INTRAVENOUS at 08:56

## 2021-01-01 RX ADMIN — LEVETIRACETAM 1000 MG: 10 INJECTION INTRAVENOUS at 20:48

## 2021-01-01 RX ADMIN — MORPHINE SULFATE 4 MG: 4 INJECTION, SOLUTION INTRAMUSCULAR; INTRAVENOUS at 12:21

## 2021-01-01 RX ADMIN — LORAZEPAM 2 MG: 2 INJECTION INTRAMUSCULAR; INTRAVENOUS at 17:17

## 2021-01-01 RX ADMIN — MORPHINE SULFATE 4 MG: 4 INJECTION, SOLUTION INTRAMUSCULAR; INTRAVENOUS at 12:29

## 2021-01-01 RX ADMIN — GLYCOPYRROLATE 0.4 MG: 0.2 INJECTION INTRAMUSCULAR; INTRAVENOUS at 00:18

## 2021-01-01 RX ADMIN — LEVETIRACETAM 500 MG: 5 INJECTION INTRAVASCULAR at 13:27

## 2021-01-01 RX ADMIN — GLYCOPYRROLATE 0.2 MG: 0.2 INJECTION INTRAMUSCULAR; INTRAVENOUS at 01:02

## 2021-01-01 RX ADMIN — GLYCOPYRROLATE 0.4 MG: 0.2 INJECTION INTRAMUSCULAR; INTRAVENOUS at 13:05

## 2021-01-01 RX ADMIN — GLYCOPYRROLATE 0.4 MG: 0.2 INJECTION INTRAMUSCULAR; INTRAVENOUS at 00:50

## 2021-01-01 RX ADMIN — GLYCOPYRROLATE 0.4 MG: 0.2 INJECTION INTRAMUSCULAR; INTRAVENOUS at 16:54

## 2021-01-01 RX ADMIN — LORAZEPAM 2 MG: 2 INJECTION INTRAMUSCULAR; INTRAVENOUS at 13:05

## 2021-01-01 RX ADMIN — LORAZEPAM 0.5 MG: 2 INJECTION INTRAMUSCULAR; INTRAVENOUS at 20:51

## 2021-01-01 RX ADMIN — LEVETIRACETAM 1000 MG: 10 INJECTION INTRAVENOUS at 20:47

## 2021-01-01 RX ADMIN — MORPHINE SULFATE 4 MG: 4 INJECTION, SOLUTION INTRAMUSCULAR; INTRAVENOUS at 17:26

## 2021-01-01 RX ADMIN — LEVETIRACETAM 1000 MG: 10 INJECTION INTRAVENOUS at 21:11

## 2021-01-01 RX ADMIN — LEVETIRACETAM 1000 MG: 10 INJECTION INTRAVENOUS at 21:32

## 2021-01-01 RX ADMIN — GLYCOPYRROLATE 0.4 MG: 0.2 INJECTION INTRAMUSCULAR; INTRAVENOUS at 08:42

## 2021-01-01 RX ADMIN — GLYCOPYRROLATE 0.4 MG: 0.2 INJECTION INTRAMUSCULAR; INTRAVENOUS at 20:44

## 2021-01-01 RX ADMIN — MORPHINE SULFATE 4 MG: 4 INJECTION, SOLUTION INTRAMUSCULAR; INTRAVENOUS at 12:48

## 2021-01-01 RX ADMIN — LORAZEPAM 2 MG: 2 INJECTION INTRAMUSCULAR; INTRAVENOUS at 20:45

## 2021-01-01 RX ADMIN — LORAZEPAM 1 MG: 2 INJECTION INTRAMUSCULAR; INTRAVENOUS at 15:30

## 2021-01-01 RX ADMIN — LORAZEPAM 0.5 MG: 2 INJECTION INTRAMUSCULAR; INTRAVENOUS at 12:42

## 2021-01-01 RX ADMIN — MORPHINE SULFATE 2 MG: 2 INJECTION, SOLUTION INTRAMUSCULAR; INTRAVENOUS at 00:46

## 2021-01-01 RX ADMIN — GLYCOPYRROLATE 0.4 MG: 0.2 INJECTION INTRAMUSCULAR; INTRAVENOUS at 00:46

## 2021-01-01 RX ADMIN — MORPHINE SULFATE 2 MG: 2 INJECTION, SOLUTION INTRAMUSCULAR; INTRAVENOUS at 04:33

## 2021-01-01 RX ADMIN — GLYCOPYRROLATE 0.4 MG: 0.2 INJECTION INTRAMUSCULAR; INTRAVENOUS at 20:54

## 2021-01-01 RX ADMIN — GLYCOPYRROLATE 0.4 MG: 0.2 INJECTION INTRAMUSCULAR; INTRAVENOUS at 12:20

## 2021-01-01 RX ADMIN — LORAZEPAM 2 MG: 2 INJECTION INTRAMUSCULAR; INTRAVENOUS at 00:50

## 2021-01-01 RX ADMIN — LEVETIRACETAM 1000 MG: 10 INJECTION INTRAVENOUS at 08:10

## 2021-01-01 RX ADMIN — LORAZEPAM 0.5 MG: 2 INJECTION INTRAMUSCULAR; INTRAVENOUS at 00:47

## 2021-01-01 RX ADMIN — MORPHINE SULFATE 4 MG: 4 INJECTION, SOLUTION INTRAMUSCULAR; INTRAVENOUS at 08:30

## 2021-01-01 RX ADMIN — MORPHINE SULFATE 2 MG: 2 INJECTION, SOLUTION INTRAMUSCULAR; INTRAVENOUS at 16:37

## 2021-01-01 RX ADMIN — SODIUM CHLORIDE, PRESERVATIVE FREE 10 ML: 5 INJECTION INTRAVENOUS at 20:24

## 2021-01-01 RX ADMIN — SODIUM CHLORIDE 500 MG: 9 INJECTION, SOLUTION INTRAVENOUS at 01:07

## 2021-01-01 RX ADMIN — SODIUM CHLORIDE 500 MG: 9 INJECTION, SOLUTION INTRAVENOUS at 10:01

## 2021-01-01 RX ADMIN — MORPHINE SULFATE 2 MG: 2 INJECTION, SOLUTION INTRAMUSCULAR; INTRAVENOUS at 17:15

## 2021-01-01 RX ADMIN — LEVETIRACETAM 1000 MG: 10 INJECTION INTRAVENOUS at 08:39

## 2021-01-01 RX ADMIN — LORAZEPAM 0.5 MG: 2 INJECTION INTRAMUSCULAR; INTRAVENOUS at 17:15

## 2021-01-01 RX ADMIN — LORAZEPAM 1 MG: 2 INJECTION INTRAMUSCULAR; INTRAVENOUS at 04:39

## 2021-01-01 RX ADMIN — GLYCOPYRROLATE 0.4 MG: 0.2 INJECTION INTRAMUSCULAR; INTRAVENOUS at 14:43

## 2021-01-01 RX ADMIN — MORPHINE SULFATE 4 MG: 4 INJECTION, SOLUTION INTRAMUSCULAR; INTRAVENOUS at 00:18

## 2021-01-01 RX ADMIN — GLYCOPYRROLATE 0.4 MG: 0.2 INJECTION INTRAMUSCULAR; INTRAVENOUS at 09:03

## 2021-01-01 RX ADMIN — ASPIRIN 300 MG: 300 SUPPOSITORY RECTAL at 08:07

## 2021-01-01 RX ADMIN — GLYCOPYRROLATE 0.4 MG: 0.2 INJECTION INTRAMUSCULAR; INTRAVENOUS at 12:29

## 2021-01-01 RX ADMIN — LORAZEPAM 0.5 MG: 2 INJECTION INTRAMUSCULAR; INTRAVENOUS at 16:37

## 2021-01-01 RX ADMIN — MORPHINE SULFATE 2 MG: 2 INJECTION, SOLUTION INTRAMUSCULAR; INTRAVENOUS at 20:56

## 2021-01-01 RX ADMIN — GLYCOPYRROLATE 0.2 MG: 0.2 INJECTION INTRAMUSCULAR; INTRAVENOUS at 16:38

## 2021-01-01 RX ADMIN — SODIUM CHLORIDE, PRESERVATIVE FREE 10 ML: 5 INJECTION INTRAVENOUS at 09:09

## 2021-01-01 RX ADMIN — LORAZEPAM 2 MG: 2 INJECTION INTRAMUSCULAR; INTRAVENOUS at 20:54

## 2021-01-01 RX ADMIN — LORAZEPAM 0.5 MG: 2 INJECTION INTRAMUSCULAR; INTRAVENOUS at 04:59

## 2021-01-01 RX ADMIN — MORPHINE SULFATE 4 MG: 4 INJECTION, SOLUTION INTRAMUSCULAR; INTRAVENOUS at 04:33

## 2021-01-01 RX ADMIN — LORAZEPAM 0.5 MG: 2 INJECTION INTRAMUSCULAR; INTRAVENOUS at 12:43

## 2021-01-01 RX ADMIN — LEVETIRACETAM 750 MG: 100 INJECTION, SOLUTION INTRAVENOUS at 21:12

## 2021-01-01 RX ADMIN — POTASSIUM CHLORIDE AND SODIUM CHLORIDE 50 ML/HR: 900; 150 INJECTION, SOLUTION INTRAVENOUS at 20:20

## 2021-01-01 RX ADMIN — LORAZEPAM 0.5 MG: 2 INJECTION INTRAMUSCULAR; INTRAVENOUS at 05:43

## 2021-01-01 RX ADMIN — MORPHINE SULFATE 4 MG: 4 INJECTION, SOLUTION INTRAMUSCULAR; INTRAVENOUS at 16:56

## 2021-01-01 RX ADMIN — POTASSIUM CHLORIDE AND SODIUM CHLORIDE 50 ML/HR: 900; 150 INJECTION, SOLUTION INTRAVENOUS at 18:03

## 2021-01-01 RX ADMIN — LORAZEPAM 2 MG: 2 INJECTION INTRAMUSCULAR; INTRAVENOUS at 12:20

## 2021-01-01 RX ADMIN — LEVETIRACETAM 1000 MG: 10 INJECTION INTRAVENOUS at 20:20

## 2021-01-01 RX ADMIN — GLYCOPYRROLATE 0.4 MG: 0.2 INJECTION INTRAMUSCULAR; INTRAVENOUS at 17:17

## 2021-01-01 RX ADMIN — POTASSIUM CHLORIDE 10 MEQ: 7.46 INJECTION, SOLUTION INTRAVENOUS at 18:04

## 2021-01-01 RX ADMIN — LEVETIRACETAM 1000 MG: 10 INJECTION INTRAVENOUS at 20:51

## 2021-01-01 RX ADMIN — GLYCOPYRROLATE 0.2 MG: 0.2 INJECTION INTRAMUSCULAR; INTRAVENOUS at 13:03

## 2021-01-01 RX ADMIN — LORAZEPAM 2 MG: 2 INJECTION INTRAMUSCULAR; INTRAVENOUS at 12:48

## 2021-01-01 RX ADMIN — MORPHINE SULFATE 4 MG: 4 INJECTION, SOLUTION INTRAMUSCULAR; INTRAVENOUS at 00:50

## 2021-01-01 RX ADMIN — LORAZEPAM 1 MG: 2 INJECTION INTRAMUSCULAR; INTRAVENOUS at 12:29

## 2021-01-01 RX ADMIN — GLYCOPYRROLATE 0.4 MG: 0.2 INJECTION INTRAMUSCULAR; INTRAVENOUS at 04:33

## 2021-01-01 RX ADMIN — GLYCOPYRROLATE 0.4 MG: 0.2 INJECTION INTRAMUSCULAR; INTRAVENOUS at 00:40

## 2021-01-01 RX ADMIN — GLYCOPYRROLATE 0.2 MG: 0.2 INJECTION INTRAMUSCULAR; INTRAVENOUS at 04:59

## 2021-01-01 RX ADMIN — MORPHINE SULFATE 4 MG: 4 INJECTION, SOLUTION INTRAMUSCULAR; INTRAVENOUS at 05:32

## 2021-01-01 RX ADMIN — MORPHINE SULFATE 4 MG: 4 INJECTION, SOLUTION INTRAMUSCULAR; INTRAVENOUS at 08:08

## 2021-01-01 RX ADMIN — LORAZEPAM 0.5 MG: 2 INJECTION INTRAMUSCULAR; INTRAVENOUS at 20:49

## 2021-01-01 RX ADMIN — LORAZEPAM 2 MG: 2 INJECTION INTRAMUSCULAR at 17:09

## 2021-01-01 RX ADMIN — LEVETIRACETAM 1000 MG: 10 INJECTION INTRAVENOUS at 12:10

## 2021-01-01 RX ADMIN — SODIUM CHLORIDE 500 MG: 9 INJECTION, SOLUTION INTRAVENOUS at 09:30

## 2021-01-01 RX ADMIN — LORAZEPAM 1 MG: 2 INJECTION INTRAMUSCULAR; INTRAVENOUS at 16:32

## 2021-01-01 RX ADMIN — LORAZEPAM 1 MG: 2 INJECTION INTRAMUSCULAR; INTRAVENOUS at 08:30

## 2021-01-01 RX ADMIN — LORAZEPAM 1 MG: 2 INJECTION INTRAMUSCULAR; INTRAVENOUS at 00:17

## 2021-01-01 RX ADMIN — MORPHINE SULFATE 2 MG: 2 INJECTION, SOLUTION INTRAMUSCULAR; INTRAVENOUS at 09:04

## 2021-01-01 RX ADMIN — LORAZEPAM 2 MG: 2 INJECTION INTRAMUSCULAR; INTRAVENOUS at 20:47

## 2021-01-01 RX ADMIN — LORAZEPAM 0.5 MG: 2 INJECTION INTRAMUSCULAR; INTRAVENOUS at 00:31

## 2021-01-01 RX ADMIN — POTASSIUM CHLORIDE 10 MEQ: 7.46 INJECTION, SOLUTION INTRAVENOUS at 19:45

## 2021-01-01 RX ADMIN — MORPHINE SULFATE 2 MG: 2 INJECTION, SOLUTION INTRAMUSCULAR; INTRAVENOUS at 00:39

## 2021-01-01 RX ADMIN — GLYCOPYRROLATE 0.4 MG: 0.2 INJECTION INTRAMUSCULAR; INTRAVENOUS at 12:54

## 2021-01-01 RX ADMIN — GLYCOPYRROLATE 0.4 MG: 0.2 INJECTION INTRAMUSCULAR; INTRAVENOUS at 20:47

## 2021-03-03 PROBLEM — R53.81 DEBILITY: Status: ACTIVE | Noted: 2021-01-01

## 2021-03-03 PROBLEM — G40.201 COMPLEX PARTIAL STATUS EPILEPTICUS (HCC): Status: ACTIVE | Noted: 2021-01-01

## 2021-03-03 PROBLEM — G40.909 SEIZURE DISORDER (HCC): Status: ACTIVE | Noted: 2021-01-01

## 2021-03-03 PROBLEM — R29.90 STROKE-LIKE SYMPTOMS: Status: ACTIVE | Noted: 2021-01-01

## 2021-03-03 NOTE — H&P
"    Patient Name:  Manju Laughlin  YOB: 1928  MRN:  [id number]  Admit Date:  3/3/2021  Patient Care Team:  Etienne Grajeda MD as PCP - General (Family Medicine)      Subjective   History Present Illness     CC: Sent from outlying facility for AMS and concerns for stroke    History of Present Illness   Ms. Laughlin is a 89 y.o. non-smoker with a history of seizures, dementia, HTN and anxiety that presents to [facility] for the above reason. The patient's daughter is at bedside and providing most all of the history as the patient is not verbal at this time. Per the daughter, the patient has been consistently in and out of the hospital since the end of December. She receives most of her care in [place] as she is currently in personal care at [facility]. At her baseline, she is alert and oriented and able to ambulate with a walker. She has been steadily declining since January of 2020 when she was thought to have a stroke at that time. She was in hospice care for some months, but then improved and this was discontinued.    She was doing well until she was hospitalized in December and again in January and February. She was initially thought to have recurrent seizure activity at a hospital stay at the beginning and end of January. Per daughter, her medications were adjusted and she improved. She apparently was started on Abilify at the end of January and was back in the hospital in February after a fall. She was taken off of this medication and improved. She last saw her usual Neurologist, Delmis Angel, in [facility] on 3/2/21 where she was started on Aricept. The daughter is unsure of any diagnosed dementia, but does report visual and auditory hallucinations that have been going on for almost a year. She was to be seeing neuropsychiatry in the near future.   The patient was again taken to the hospital last night as the patient was found to be \"twitching\" on her left side. She was " "reportedly more \"drawn in\" on her left side as well and taken for further evaluation. She reportedly had a CT of her head that was negative and given loading dose of Keppra. She remained nonresponsive and was transferred here for stroke evaluation. Since arrival to this facility, the patient has remained nonverbal, but is starting to have some yawning, spontaneously eye movement at the time of this visit. Lab work as well as EEG and CXR have been ordered. Neurology has been consulted.    Review of Systems   Unable to perform ROS: Mental status change      Personal History     Past Medical History:   Diagnosis Date   • Anxiety    • Hypertension    • Monoclonal gammopathy    • Seizure (CMS/HCC)      No past surgical history on file.  No family history on file.  Social History     Tobacco Use   • Smoking status: Not on file   Substance Use Topics   • Alcohol use: Not on file   • Drug use: Not on file     Medications Prior to Admission   Medication Sig Dispense Refill Last Dose   • amLODIPine (NORVASC) 5 MG tablet Take 5 mg by mouth Daily.   3/2/2021 at Unknown time   • ARIPiprazole (ABILIFY) 2 MG tablet Take 2 mg by mouth Daily.   3/2/2021 at Unknown time   • aspirin 81 MG chewable tablet Chew 81 mg Daily.   3/2/2021 at Unknown time   • cholecalciferol (VITAMIN D3) 25 MCG (1000 UT) tablet Take 1,000 Units by mouth Daily.   3/2/2021 at Unknown time   • divalproex (DEPAKOTE) 250 MG DR tablet Take 250 mg by mouth 2 (Two) Times a Day.   3/2/2021 at Unknown time   • [START ON 3/8/2021] donepezil (ARICEPT) 10 MG tablet Take 10 mg by mouth Every Night.      • donepezil (ARICEPT) 5 MG tablet Take 5 mg by mouth Every Night.   3/2/2021 at Unknown time   • levETIRAcetam (KEPPRA) 250 MG tablet Take 250 mg by mouth 2 (Two) Times a Day.   3/2/2021 at Unknown time   • mirtazapine (REMERON) 30 MG tablet Take 30 mg by mouth Every Night.   3/2/2021 at Unknown time   • multivitamin with minerals (THERA-M PO) Take 1 tablet by mouth Daily. "   3/2/2021 at Unknown time   • nadolol (CORGARD) 40 MG tablet Take 40 mg by mouth Daily.   3/2/2021 at Unknown time   • polyethylene glycol (MIRALAX) 17 g packet Take 17 g by mouth Daily.   3/2/2021 at Unknown time   • sertraline (ZOLOFT) 100 MG tablet Take 100 mg by mouth Daily.   3/2/2021 at Unknown time     Allergies:    Allergies   Allergen Reactions   • Codeine Unknown - Low Severity   • Sulfa Antibiotics Unknown - Low Severity       Objective    Objective     Vital Signs  Temp:  [97.7 °F (36.5 °C)] 97.7 °F (36.5 °C)  Heart Rate:  [63-69] 69  Resp:  [15-17] 15  BP: (106-174)/(55-76) 174/76  SpO2:  [91 %-94 %] 93 %  on   ;   Device (Oxygen Therapy): room air  There is no height or weight on file to calculate BMI.    Physical Exam  Vitals signs and nursing note reviewed.   Constitutional:       General: She is not in acute distress.     Appearance: She is ill-appearing. She is not toxic-appearing.      Comments: Frail, elderly.   HENT:      Head:      Comments: Left facial droop noted     Right Ear: External ear normal.      Left Ear: External ear normal.   Eyes:      General:         Right eye: No discharge.         Left eye: No discharge.      Pupils: Pupils are equal, round, and reactive to light.   Cardiovascular:      Rate and Rhythm: Normal rate and regular rhythm.      Pulses: Normal pulses.      Heart sounds: Normal heart sounds.   Pulmonary:      Effort: Pulmonary effort is normal. No respiratory distress.      Comments: Diminished, poor inspiratory effort. On RA  Abdominal:      General: Bowel sounds are normal. There is no distension.      Palpations: Abdomen is soft.      Tenderness: There is no abdominal tenderness.   Musculoskeletal:         General: No swelling or tenderness.      Comments: No spontaneous movement. Does  some on right hand.    Skin:     General: Skin is warm and dry.      Coloration: Skin is pale.      Findings: No bruising.   Neurological:      Comments: Nonverbal. Unable to  follow commands although will squeeze with right hand. Unable to assess orientation.   Psychiatric:      Comments: Withdrawn. Nonresponsive. Will yawn and wince eyes.        Results Review:  I reviewed the patient's new clinical results.  I reviewed the patient's new imaging results and agree with the interpretation.  I reviewed the patient's other test results and agree with the interpretation  I personally viewed and interpreted the patient's EKG/Telemetry data    Lab Results (last 24 hours)     Procedure Component Value Units Date/Time    Comprehensive Metabolic Panel [[id number]]  (Abnormal) Collected: 03/03/21 1238    Specimen: Blood from Arm, Left Updated: 03/03/21 1332     Glucose 100 mg/dL      BUN 11 mg/dL      Creatinine 0.75 mg/dL      Sodium 140 mmol/L      Potassium 3.3 mmol/L      Chloride 97 mmol/L      CO2 31.2 mmol/L      Calcium 8.9 mg/dL      Total Protein 6.7 g/dL      Albumin 3.80 g/dL      ALT (SGPT) 49 U/L      AST (SGOT) 71 U/L      Alkaline Phosphatase 89 U/L      Total Bilirubin 0.6 mg/dL      eGFR Non African Amer 72 mL/min/1.73      Globulin 2.9 gm/dL      A/G Ratio 1.3 g/dL      BUN/Creatinine Ratio 14.7     Anion Gap 11.8 mmol/L     Narrative:      GFR Normal >60  Chronic Kidney Disease <60  Kidney Failure <15      CBC & Differential [[id number]]  (Abnormal) Collected: 03/03/21 1238    Specimen: Blood from Arm, Left Updated: 03/03/21 1317    Narrative:      The following orders were created for panel order CBC & Differential.  Procedure                               Abnormality         Status                     ---------                               -----------         ------                     CBC Auto Differential[[id number]]        Abnormal            Final result                 Please view results for these tests on the individual orders.    Magnesium [[id number]]  (Normal) Collected: 03/03/21 1238    Specimen: Blood from Arm, Left Updated: 03/03/21 1332     Magnesium 2.0 mg/dL      Ammonia [[id number]]  (Normal) Collected: 03/03/21 1238    Specimen: Blood from Arm, Left Updated: 03/03/21 1316     Ammonia 16 umol/L     RPR [[id number]] Collected: 03/03/21 1238    Specimen: Blood from Arm, Left Updated: 03/03/21 1256    Vitamin B12 [[id number]]  (Abnormal) Collected: 03/03/21 1238    Specimen: Blood from Arm, Left Updated: 03/03/21 1350     Vitamin B-12 1,164 pg/mL     Narrative:      Results may be falsely increased if patient taking Biotin.      CBC Auto Differential [[id number]]  (Abnormal) Collected: 03/03/21 1238    Specimen: Blood from Arm, Left Updated: 03/03/21 1317     WBC 6.52 10*3/mm3      RBC 4.36 10*6/mm3      Hemoglobin 13.1 g/dL      Hematocrit 38.8 %      MCV 89.0 fL      MCH 30.0 pg      MCHC 33.8 g/dL      RDW 14.6 %      RDW-SD 46.5 fl      MPV 10.3 fL      Platelets 162 10*3/mm3      Neutrophil % 56.9 %      Lymphocyte % 26.4 %      Monocyte % 16.1 %      Eosinophil % 0.0 %      Basophil % 0.3 %      Immature Grans % 0.3 %      Neutrophils, Absolute 3.71 10*3/mm3      Lymphocytes, Absolute 1.72 10*3/mm3      Monocytes, Absolute 1.05 10*3/mm3      Eosinophils, Absolute 0.00 10*3/mm3      Basophils, Absolute 0.02 10*3/mm3      Immature Grans, Absolute 0.02 10*3/mm3      nRBC 0.0 /100 WBC     Troponin [[id number]]  (Normal) Collected: 03/03/21 1238    Specimen: Blood from Arm, Left Updated: 03/03/21 1331     Troponin T <0.010 ng/mL     Narrative:      Troponin T Reference Range:  <= 0.03 ng/mL-   Negative for AMI  >0.03 ng/mL-     Abnormal for myocardial necrosis.  Clinicians would have to utilize clinical acumen, EKG, Troponin and serial changes to determine if it is an Acute Myocardial Infarction or myocardial injury due to an underlying chronic condition.       Results may be falsely decreased if patient taking Biotin.      POC Glucose Once [[id number]]  (Normal) Collected: 03/03/21 1314    Specimen: Blood Updated: 03/03/21 1316     Glucose 108 mg/dL           Imaging Results (Last  24 Hours)     Procedure Component Value Units Date/Time    XR Chest 1 View [[id number]] Collected: 03/03/21 1337     Updated: 03/03/21 1341    Narrative:      ONE VIEW PORTABLE CHEST at 1:06 PM     HISTORY: Confusion. Hypertension.     FINDINGS: There are no prior exams for comparison. There is mild  cardiomegaly. The lungs are moderately expanded with some slight  increased density at the left base suggesting some atelectasis and  pleural effusion. I cannot completely exclude minimal developing  infiltrate and continued follow-up evaluation may be helpful.     This report was finalized on 3/3/2021 1:38 PM by Dr. Laith Avila M.D.                  ECG 12 Lead   Preliminary Result   HEART RATE= 66  bpm   RR Interval= 904  ms   MI Interval= 199  ms   P Horizontal Axis= 64  deg   P Front Axis= 68  deg   QRSD Interval= 82  ms   QT Interval= 439  ms   QRS Axis= 27  deg   T Wave Axis= 60  deg   - NORMAL ECG -   Sinus rhythm   Electronically Signed By:    Date and Time of Study: 2021-03-03 11:53:53           Assessment/Plan     Active Hospital Problems    Diagnosis POA   • **Stroke-like symptoms [R29.90] Yes   • Seizure disorder (CMS/HCC) [G40.909] Yes   • Hypertension [I10] Yes   • Anxiety [F41.9] Yes   • Dementia (CMS/HCC) [F03.90] Yes   • Metabolic encephalopathy [G93.41] Yes   • Hypokalemia [E87.6] Unknown     Mrs. Laughlin is a 89 year old female who was directly admitted to the hospital with left sided weakness, altered mental status and tremors.    Stroke-like symptoms/Seizure disorder  -CT head at outlying facility reported negative for acute findings. UA and CXR ordered.  -CXR with some atelectasis/effusion on left with inability to exclude developing infiltrate. No leukocytosis, fever, respiratory compromise. Will monitor closely. Add procalcitonin.  -With facial asymmetry and what appears to be left sided weakness, will check MRI brain. -Neurology consulted. EEG ordered with no electrographic seizures present,  but moderate diffuse slowing as well as focal right hemispheric slowing which could indicate area of cerebral dysfunction. Await neuro input. Keppra 500 mg IV BID ordered (she takes 250 mg BID at home). Seizure precautions. Change PO Depakote to IV (home med). Check valproic acid level.   -Hold on CTA head/neck for now. Defer to neuro. If large stroke found, patient would most likely benefit from palliative care.   -ASA/(no statin related to elevated LFTs). PT/OT/ST. IVF with NS at 100.   -Lipid panel, A1c, vitamin B12/folate.    Metabolic encephalopathy  -Likely due to the above, but awaiting further work up to rule out infectious etiology. Monitor closely.    HTN  -BP stable. Hold home Norvasc. Continue atenolol with holding parameters to avoid hypotension.    Hypokalemia  -Mild. Add protocol.    Dementia  -Recently started on Aricept. Try to get outpatient records from Neurology.  -Monitor mental status. Resume home regimen for when able to take PO.    Elevated LFTs  -Mild. Unclear prior baseline. Will monitor for now. Hold statin therapy.  -Check PT/INR.    · I discussed the patients findings and my recommendations with patient, family, nursing staff and Dr. Diaz.    VTE Prophylaxis - SCDs.  Code Status - No CPR.    Long discussion with patient's daughter at bedside. She is a limited code from prior. With declining status over the last few months and even last year, she would benefit from palliative consultation. Daughter agreeable. She would like further work up to see if anything is reversible or to attempt improvement in her current mental state. Pending this, will consider hospice/palliative. Will consult the team. Discussed with Brandy as well.        MADIHA Polanco  [facility]  03/03/21  13:52 EST

## 2021-03-03 NOTE — CONSULTS
"Neurology Consult Note    Consult Date: 3/3/2021    Referring MD: Kev Melo MD    Reason for Consult I have been asked to see the patient in neurological consultation to render advice and opinion regarding altered mental status, shaking    Manju Laughlin is a 89 y.o. female with past medical history of monoclonal gammopathy, seizure disorder, nursing home resident.  There has been some suspicion for dementia with hallucinations and memory changes.  Neuropsych testing was planned.  She was started on Abilify previously which was later stopped and then Aricept was started.    She was referred from her nursing home in [place] for left-sided twitching.  By report she was \"drawing in\" on the left side.  She was given a loading dose of Keppra but remained severely encephalopathic and was ultimately transferred here.  Since admission her encephalopathy has been persistent.  She has remained nonverbal.  On my initial evaluation she was moderately aphasic but her twitching was actually on the right side when I initially saw her.    Past Medical History:   Diagnosis Date   • Anxiety    • Hypertension    • Monoclonal gammopathy    • Seizure (CMS/HCC)        ROS:  No fevers, chills  + Weakness, aphasia, convulsions    Exam  /76 (BP Location: Left arm, Patient Position: Lying)   Pulse 69   Temp 97.7 °F (36.5 °C) (Oral)   Resp 15   SpO2 93%   Gen: NAD, vitals reviewed  MS: Lethargic but opens eyes to voice, follows some commands, moderate motor and receptive aphasia, severely impaired attention  CN: visual acuity grossly normal, blinks to threat bilaterally, intermittent right gaze deviation, severe dysarthria  Motor: 4+/5 right upper extremity, 4/5 left upper extremity, limited effort, nearly continuous approximately 1 Hz twitching of right upper extremity, somewhat position dependent    DATA:    Lab Results   Component Value Date    GLUCOSE 100 (H) 03/03/2021    CALCIUM 8.9 03/03/2021     03/03/2021    K 3.3 " (L) 03/03/2021    CO2 31.2 (H) 03/03/2021    CL 97 (L) 03/03/2021    BUN 11 03/03/2021    CREATININE 0.75 03/03/2021    EGFRIFNONA 72 03/03/2021    BCR 14.7 03/03/2021    ANIONGAP 11.8 03/03/2021     Lab Results   Component Value Date    WBC 6.52 03/03/2021    HGB 13.1 03/03/2021    HCT 38.8 03/03/2021    MCV 89.0 03/03/2021     03/03/2021       Lab review: CBC normal, potassium 3.3, calcium normal    Imaging review: MRI brain pending    EEG from this morning showed focal right hemispheric slowing with right frontotemporal quasiperiodic lateralized discharges. 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  Her EEG earlier today would be overall supportive of that.  I will go back up the EEG shortly and if her right frontotemporal PLEDs persist we will try 1-2 doses of Ativan.  I will defer MRI for now but this should probably be done sometime tomorrow    PLAN:  Increase Keppra to 750 every 12 hours  Depakote restarted, I am going to order a loading dose for status epilepticus  Continuous EEG overnight. Will try Ativan 2-4mg if PLEDs persist  Likely will pursue brain MRI tomorrow AM    Addendum: Continuous EEG reviewed. It still shows right frontal PLEDs. I think the patient is in complex partial status. No current shaking but she is mostly unresponsive. Her previous reported shaking was on the left side which would correlate. I tried Ativan 2mg at the bedside with no change on the EEG. I am not really comfortable giving more Ativan given her age. I will try to load Depacon and see how she responds with that and the increased dose of Keppra. If no improvement by tomorrow I will discuss with her family options for further escalation vs. Palliative care which has been considered (patient is DNR with underlying dementia)

## 2021-03-04 PROBLEM — R56.9 SEIZURE (HCC): Status: ACTIVE | Noted: 2021-01-01

## 2021-03-04 NOTE — PROGRESS NOTES
DOS: 3/4/2021  NAME: Manju Laughlin   : 1928  PCP: Etienne Grajeda MD  Seizure     Patient seen in follow-up today; new to me          Subjective: Patient with decreased responsiveness and some seizure-like activity noted with twitching in right upper extremity.  Continuous EEG in place overnight; since removed.    Daughter at bedside; discussed goals of care, prognosis, medications    Objective:  Vital signs: /94 (BP Location: Right arm, Patient Position: Lying)   Pulse 54   Temp 97.7 °F (36.5 °C) (Oral)   Resp 17   Wt 69.3 kg (152 lb 12.5 oz)   SpO2 94%       HEENT: Normocephalic, atraumatic   COR: RRR  Resp: Even and unlabored  Extremities: Equal pulses, nondistal embolization    Neurological:   MS: Lethargic; does not open eyes to voice.  Does not follow commands.  Appears to force eyes shut  CN: Limited exam  Motor: 5/5 on right upper and lower extremity and left lower.  Does not withdrawal to stimuli on left upper extremity although responses   Reflexes: Neutral  Sensory: Withdraws to stimuli in all ext ext LUE   Coordination: unable     Laboratory results:  Lab Results   Component Value Date    GLUCOSE 79 2021    CALCIUM 8.2 2021     2021    K 3.8 2021    CO2 30.0 (H) 2021     2021    BUN 21 2021    CREATININE 0.70 2021    EGFRIFNONA 78 2021    BCR 30.0 (H) 2021    ANIONGAP 10.0 2021     Lab Results   Component Value Date    WBC 6.06 2021    HGB 14.8 2021    HCT 44.4 2021    MCV 91.5 2021     2021     Lab Results   Component Value Date    CHOL 219 (H) 2021     Lab Results   Component Value Date    HDL 46 2021     Lab Results   Component Value Date     (H) 2021     Lab Results   Component Value Date    TRIG 121 2021         Lab 21  0626   HEMOGLOBIN A1C 5.80*          Review and interpretation of imaging:  Narrative & Impression     "  Date of onset: 3/3/2021 at 16:46  Date of offset:3/4/2021 at 8:37     Indication:Manju Laughlin is a 89 y.o. female with past medical history of monoclonal gammopathy, seizure disorder, nursing home resident who has been having left-sided twitching.  By report she was \"drawing in\" on the left side.  She was given a loading dose of Keppra but remained severely encephalopathic and was ultimately transferred for her encephalopathy which has been persistent.  She has remained nonverbal with concerns for right frontotemporal PLEDs with concerns for focal status epilepticus for which she is on continuous video EEG monitoring.     Technical description:  This is a 21 channel digital EEG recording with continuous video monitoring that began on 3/3/2021 at 16:46 and ended on 3/4/2021 at 8:37.  Electrodes were placed based on the 10-20 international electrode placement system.  Fransisco and seizure detection software programs were used.     Background:  The EEG recording demonstrates moderate diffuse background slowing with no definitive alpha background rhythm and a mix of theta and delta frequencies.  There is focal right hemispheric slowing with increase in delta activities.  There also right frontotemporal periodic lateralized discharges with maximum discharges at F8/T4 and at times T6 and P4.  At times these have a more triphasic morphology and at time they become rhythmic.  Muscle artifact contaminates the recording at time but these epileptiform discharges are noted throughout the recording.  No sleep architecture is present.  Hyperventilation and photic stimulation were not performed.     The EKG monitor shows a heart rate that varies between 65 and 70 beats per minute.      Clinical interpretation:  This prolonged 15 hour and 51 minute continuous video EEG recording is abnormal due to moderate severe slowing of the background indicative of a moderate encephalopathic process.  Furthermore there is focal slowing of the " right hemisphere indicative of an underlying physiologic or structural abnormality.  There is epileptogenic activity, seizure activity, and PLEDs noted in the right frontotemporal head region and at times also involving the parietal head region indicative of epileptogenic focus in this region and at times focal status arising from this region.  These findings were discussed with treating neurologist who was made aware of these findings.  Careful clinical correlation is advised.    Narrative & Impression      Date of onset: March 3, 2021 at 10:59 AM  Date of offset: March 3, 2021 at 11:26 AM     Indication: Possible seizure     Technical description:  This is a 21 channel digital EEG recording that began on March 3, 2021 at 10:59 AM and ended on March 3, 2021 at 11:26 AM.  Fransisco and seizure detection software programs were used.     Background:  The EEG recording demonstrates mild to moderate diffuse background slowing with an admixture of theta and delta frequencies.  There is focal right hemispheric slowing the increase in delta activities.  There also right frontotemporal because a periodic lateralized discharges.  At times these have a more triphasic morphology.  No sleep architecture is present.  Hyperventilation and photic stimulation were not performed.     The EKG monitor shows a heart rate that varies between 65 and 70 beats per minute.     Clinical interpretation:  This routine EEG is abnormal due to the presence of:     1.  Mild to moderate diffuse background slowing.  The results of study likely indicate a mild to moderate encephalopathy.       2.  Focal right hemispheric slowing which may indicate underlying area of cerebral dysfunction.       4.  Right frontotemporal quasiperiodic lateralized discharges.  This may indicate underlying area of cortical irritability and a predilection to focal onset seizures originating the right frontotemporal head region.       No electrographic seizures are present  during this recording.  Careful clinical and radiographic correlation is advised.       Impression:  1.  Status epilepticus; seizure disorder    Comment: Abnormal EEG 3/3 with PLEDs that did not respond to IV Ativan.  Depacon load given due to lack of response and abnormal EEG on 3/3.  Overnight/continuous EEG system with status epilepticus therefore Celebrex loaded/Keppra increased to 1 g twice daily and plan to repeat EEG in a.m.  Discussion with daughter; palliative consult completed to assist with goals of care. Call RRT for any acute neurological changes and/or concerns. We will continue to follow and advise.         Plan:  · Cerebyx load 20 mg per kilogram  · Increase Keppra to 1 g twice daily  · Repeat EEG in a.m.      Case reviewed with attending neurologist Dr. Porter Jefferson and he agrees with treatment plan above    MADIHA Beatty     Addenda:  16:40 EST    Went back to bedside to to discuss EEG which revealed patient in status epilepticus therefore Cerebyx load was ordered and Keppra was increased along with order for a.m. EEG to be completed.      Daughter had spoken with palliative services prior to arrival and she is on board with proceeding with transitioning to palliative care services but would like to speak with her siblings to confirm.  Discussed typical status epilepticus patients require intubation and sedation to assist with breaking seizure cycle although we could continue to escalate AEDs and add third medication if needed which patient daughter declined.  Should patient transferred to palliative care services I would recommend continuing AEDs through end-of-life.    EEG (3/3) Continuous   Clinical interpretation:  This prolonged 15 hour and 51 minute continuous video EEG recording is abnormal due to moderate severe slowing of the background indicative of a moderate encephalopathic process.  Furthermore there is focal slowing of the right hemisphere indicative of an underlying  physiologic or structural abnormality.  There is epileptogenic activity, seizure activity, and PLEDs noted in the right frontotemporal head region and at times also involving the parietal head region indicative of epileptogenic focus in this region and at times focal status arising from this region.  These findings were discussed with treating neurologist who was made aware of these findings.  Careful clinical correlation is advised.      Arminda Light, APRN

## 2021-03-04 NOTE — PLAN OF CARE
Goal Outcome Evaluation:  Plan of Care Reviewed With: patient  Progress: no change  Outcome Summary: Responds to tactile stimuli, noted twitching of RUE whenever disturbed for patient care, attempted to straight cath for UA but started twitching so procedure was aborted.  Will continue to monitor.  Continuous EEG monitoring overnight.

## 2021-03-04 NOTE — PLAN OF CARE
Goal Outcome Evaluation:  Plan of Care Reviewed With: patient, daughter  Progress: no change  Outcome Summary: Pt transfered to [facility] at 1814 for palliative care. Daughter Pat at bedside, explained palliative care and she is agreeable to palliative medications as needed. Plan is to continue antiseizure medications. Pt appears to be resting comfortably at this time. Will continue to monitor and provide palliative care.

## 2021-03-04 NOTE — PLAN OF CARE
Goal Outcome Evaluation:  Plan of Care Reviewed With: patient, daughter     Outcome Summary: No changes in neuro status. Patient's daughter/POA met with palliative care nurse today and after speaking to attending and neurology MD, decided to transition pt to palliative care. Patient going to room# 480, report called to Nirmala ZHOU..

## 2021-03-04 NOTE — CONSULTS
Purpose of the visit was to evaluate for: goals of care/advanced care planning, support for patient/family, hospice referral/discussion, pain/symptom management and comfort care. Spoke with RN as well as family and discussed palliative care, goals of care, resuscitation status and [facility].      Assessment:  Patient is palliative care appropriate given stroke like symptoms, seizure disorder, dementia, unresponsive. PPS: 10%. Psychosocial Acuity: 1-normal complexity. Spiritual Acuity: 1-normal complexity.     Recommendations/Plan: Dgtr leaning towards comfort care. She wants to speak to her 5 siblings prior to making decision. Dgtr wanting to hear from medical team if patient will have any further improvements.     Other Comments:   Palliative Care spoke to patient's dgtr at bedside. She voiced understanding of patient's current condition and overall prognosis. Family does not want any life prolonging measures. Family still inquiring about what to expect as far as if patient will improve at all. Provided education and information to dgtr about palliative care, comfort care, symptom management, and likely hospice consult. Will follow up with patient and family tomorrow to see if family has made decision on goals of care.

## 2021-03-04 NOTE — SIGNIFICANT NOTE
03/04/21 1353   OTHER   Discipline speech language pathologist   Rehab Time/Intention   Session Not Performed other (see comments)  (Discussed pt with RN. Pt is too lethargic to particpate in Clinical Swallow Eval. ST will f/u next date.)

## 2021-03-04 NOTE — SIGNIFICANT NOTE
Dr. Diaz notified by staff that patient's daughter would like full comfort care at this time. Will transfer to [facility] and start palliative care order set (with modifications to Ativan and Morphine dosing as ordered). Code status COMFORT MEASURES.

## 2021-03-04 NOTE — PROGRESS NOTES
Discharge Planning Assessment  [facility]     Patient Name: Manju Laughlin  MRN: [id number]  Today's Date: 3/4/2021    Admit Date: 3/3/2021    Discharge Needs Assessment     Row Name 03/04/21 1125       Living Environment    Lives With  facility resident    Name(s) of Who Lives With Patient  Personal Care at [facility]    Current Living Arrangements  independent/assisted living facility    Primary Care Provided by  other (see comments) facility staff    Provides Primary Care For  no one, unable/limited ability to care for self    Family Caregiver if Needed  child(roxi), adult    Family Caregiver Names  daughter Jessica is involved    Quality of Family Relationships  helpful;involved;supportive    Able to Return to Prior Arrangements  yes       Resource/Environmental Concerns    Resource/Environmental Concerns  none    Transportation Concerns  car, none       Transition Planning    Patient/Family Anticipates Transition to  inpatient rehabilitation facility;inpatient hospice    Patient/Family Anticipated Services at Transition      Transportation Anticipated  family or friend will provide       Discharge Needs Assessment    Equipment Currently Used at Home  walker, standard;wheelchair    Concerns to be Addressed  discharge planning    Anticipated Changes Related to Illness  none    Equipment Needed After Discharge  none        Discharge Plan     Row Name 03/04/21 1126       Plan    Plan  Return to [facility] if able, Palliative pending also    Patient/Family in Agreement with Plan  yes    Plan Comments  Met with daughter at the bedside. Explained CCP role and verified face sheet. Patient is in PC at [facility]. She uses a walker and WC there. Staff assists with ADLs and medications. Patient has used [facility] in the past and would use them again. No previous SNF. Plan would be to return to [facility] if patient is able to. Daughter unsure if she will respond to treatment and stated there is a  Palliative consult in just in case they need that. CCP will follow for DC needs. Sandra Lemons Rn        Continued Care and Services - Admitted Since 3/3/2021     Destination     Service Provider Request Status Selected Services Address Phone Fax Patient Preferred    [facility]  Pending - Request Sent N/A [address] [facility] [id number] [phone] --                Demographic Summary     Row Name 03/04/21 1124       General Information    Admission Type  inpatient    Arrived From  emergency department    Required Notices Provided  Important Message from Medicare    Referral Source  admission list    Reason for Consult  discharge planning    Preferred Language  English        Functional Status     Row Name 03/04/21 1124       Functional Status    Usual Activity Tolerance  moderate    Current Activity Tolerance  moderate       Functional Status, IADL    Medications  completely dependent    Meal Preparation  completely dependent    Housekeeping  completely dependent    Laundry  completely dependent    Shopping  completely dependent        Psychosocial     Row Name 03/04/21 1124       Coping/Stress    Sources of Support  adult child(roxi)       Developmental Stage (Eriksson's)    Developmental Stage  Stage 8 (65 years-death/Late Adulthood) Integrity vs. Despair        Abuse/Neglect    No documentation.       Legal    No documentation.       Substance Abuse    No documentation.       Patient Forms    No documentation.           Sandra Lemons RN

## 2021-03-04 NOTE — PROGRESS NOTES
Adult Nutrition  Assessment/PES    Patient Name:  Manju Laughlin  YOB: 1928  MRN: [id number]  Admit Date:  3/3/2021    Assessment Date:  3/4/2021    Comments:  Nutrition Screen: Kev: 12  89 y.o female with stroke-like symptoms/seizure disorder.  Remains NPO at this time.  RD to follow clinical course.    Reason for Assessment     Row Name 03/04/21 1156          Reason for Assessment    Reason For Assessment  identified at risk by screening criteria     Diagnosis  other (see comments) Seizure, stroke like symptoms, H/O: HTN, dementia, anxiety     Identified At Risk by Screening Criteria  other (see comments) B: 12         Nutrition/Diet History     Row Name 03/04/21 1157          Nutrition/Diet History    Typical Food/Fluid Intake  In neuro diagnotics for EEG.  Remains NPO at this time.     Factors Affecting Nutritional Intake  impaired cognitive status/motor control         Anthropometrics     Row Name 03/04/21 1158          Anthropometrics    Height  -- no height recorded        Admit Weight    Admit Weight  69.3 kg (152 lb 12.5 oz) 3/3         Labs/Tests/Procedures/Meds     Row Name 03/04/21 1158          Labs/Procedures/Meds    Lab Results Reviewed  reviewed, pertinent     Lab Results Comments  K: 3.3, Cl: 97, ALT        Diagnostic Tests/Procedures    Diagnostic Test/Procedure Reviewed  reviewed, pertinent     Diagnostic Test/Procedures Comments  EEG        Medications    Pertinent Medications Reviewed  reviewed, pertinent     Pertinent Medications Comments  Vit D3, Keppra, Remeron, MVI, Miralax, 50mL/hr IVF         Physical Findings     Row Name 03/04/21 1200          Physical Findings    Oral/Mouth Cavity  poor dentition     Skin  other (see comments) B: 12         Estimated/Assessed Needs     Row Name 03/04/21 1158          Calculation Measurements    Height  -- no height recorded         Nutrition Prescription Ordered     Row Name 03/04/21 1201          Nutrition Prescription PO    Current PO  Diet  NPO         Evaluation of Received Nutrient/Fluid Intake     Row Name 03/04/21 1201          Fluid Intake Evaluation    IV Fluid (mL)  1200           Problem/Interventions:  Problem 1     Row Name 03/04/21 1202          Nutrition Diagnoses Problem 1    Problem 1  Inadequate Nutrient Intake     Etiology (related to)  Factors Affecting Nutrition     Mental State/Condition  Confusion     Signs/Symptoms (evidenced by)  NPO               Intervention Goal     Row Name 03/04/21 1202          Intervention Goal    General  Reduce/improve symptoms;Meet nutritional needs for age/condition     PO  Initiate feeding     Weight  Maintain weight         Nutrition Intervention     Row Name 03/04/21 1202          Nutrition Intervention    RD/Tech Action  Follow Tx progress;Care plan reviewd;Await begin PO           Education/Evaluation     Row Name 03/04/21 1202          Education    Education  Will Instruct as appropriate        Monitor/Evaluation    Monitor  Per protocol;Symptoms     Education Follow-up  Reinforce PRN           Electronically signed by:  Deysi Gilliland RD  03/04/21 12:03 EST

## 2021-03-04 NOTE — DISCHARGE PLACEMENT REQUEST
Angelica Brown (89 y.o. Female)     Date of Birth Social Security Number Address Home Phone MRN    08/02/1928  [address]  [address]  [facility] [address] [phone] [id number]    [employer] Marital Status          Mandaeism        Admission Date Admission Type Admitting Provider Attending Provider Department, Room/Bed    3/3/21 Urgent Eduardo Diaz MD Furlow, Stephen Matthew, MD [address], P582/1    Discharge Date Discharge Disposition Discharge Destination                       Attending Provider: Eduardo Diaz MD    Allergies: Codeine, Sulfa Antibiotics    Isolation: None   Infection: None   Code Status: No CPR    Ht: --   Wt: 69.3 kg (152 lb 12.5 oz)    Admission Cmt: None   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]                 Active Insurance as of 3/3/2021     Primary Coverage     Payor Plan Insurance Group Employer/Plan Group    MEDICARE MEDICARE A & B      Payor Plan Address Payor Plan Phone Number Payor Plan Fax Number Effective Dates    [address] [phone]  8/1/1993 - None Entered    [facility] [id number]       Subscriber Name Subscriber Birth Date Member ID       ANGELICA BROWN 8/2/1928 [id number]           Secondary Coverage     Payor Plan Insurance Group Employer/Plan Group    [facility] SUPP [id number]     Payor Plan Address Payor Plan Phone Number Payor Plan Fax Number Effective Dates    [address]   12/1/2016 - None Entered    [facility] [id number]       Subscriber Name Subscriber Birth Date Member ID       ANGELICA BROWN 8/2/1928 [id number]                 Emergency Contacts      (Rel.) Home Phone Work Phone Mobile Phone    Jessica Grajeda (REZAA) (Daughter) [phone] -- [phone]            {Outbreak/Travel/Exposure Documentation......;  Question Available Choices Patient Response   Outbreak Screen: Do you currently have a new onset of the following symptoms?         Fever/Chils, Cough, Shortness of air, Loss of taste or smell, No, Unknown  Unknown (03/03/21 0413)   Outbreak Screen: In the last 14 days, have you had contact with anyone who is ill, has show any of the symptoms listed above and/or has been diagnosis with the 2019 Novel Coronavirus? This includes any immediate household members but excludes any patients with whom you have been in contact within your normal work duties wearing proper PPE, if you are a healthcare worker.  Yes, No, Unknown              Unknown (03/03/21 0413)   Outbreak Screen: Who was notified?    Free text  (not recorded)   Travel Screen: Have you traveled in the last month? If so, to what country have you traveled? If [place] what state? Yes, No, Unknown  List of all countries  List of all States No (03/03/21 1608)  (not recorded)  (not recorded)   Infection Risk: Do you currently have the following symptoms?  (If cough is selected, the Tuberculosis Screen is performed.) Cough, Fever, Rash, No No (03/03/21 1608)   Tuberculosis Screen: Do you have any of the following Tuberculosis Risks?  · Have you lived or spent time with anyone who had or may have TB?  · Have you lived in or visited any of the following areas for more than one month: [place], [place], [place], [place] or [place], the [place] or [place]?  · Do you have HIV/AIDS?  · Have you lived in or worked in a nursing home, homeless shelter, correctional facility, or substance abuse treatment facility?   · No    If Yes do you have any of the following symptoms? Yes responses display to the right    If Yes, symptoms listed are:  Cough greater than or equal to 3 weeks, Loss of appetite, Unexplained weight loss, Night sweats, Bloody sputum or hemoptysis, Hoarseness, Fever, Fatigue, Chest pain, No (not recorded)  (not recorded)   Exposure Screen: Have you been exposed to any of these contagious diseases in the last month? Measles, Chickenpox, Meningitis, Pertussis, Whooping Cough, No No  (03/03/21 0771)

## 2021-03-04 NOTE — SIGNIFICANT NOTE
03/04/21 1336   OTHER   Discipline occupational therapist   Rehab Time/Intention   Session Not Performed other (see comments)  (pt not following commands per nsg, pt hasn't changed any since admit per nsg. also noted palliative care consult. Pt not appropriate today for therapy. 1027)

## 2021-03-04 NOTE — PROGRESS NOTES
Name: Manju Laughlin ADMIT: 3/3/2021   : 1928  PCP: Etienne Grajeda MD    MRN: [id number] LOS: 1 days   AGE/SEX: 89 y.o. female  ROOM: [employer] [facility] [employer]     Subjective   Subjective   Patient in bed. Daughter at bedside. Still unresponsive. ROS unavailable.   Neurology informed daughter of continued seizures on EEG- meds adjusted. Maddi Light will stop by later. Татьяна with palliative came in earlier.      Objective   Objective   Vital Signs  Temp:  [97.2 °F (36.2 °C)-97.7 °F (36.5 °C)] 97.2 °F (36.2 °C)  Heart Rate:  [54-67] 56  Resp:  [16-20] 16  BP: (104-196)/(73-94) 196/82  SpO2:  [94 %-97 %] 97 %  on  Flow (L/min):  [2] 2;   Device (Oxygen Therapy): nasal cannula  There is no height or weight on file to calculate BMI.     Physical Exam  Vitals signs and nursing note reviewed.   Constitutional:       General: She is not in acute distress.     Appearance: She is ill-appearing. She is not toxic-appearing.      Comments: Frail, elderly.   Cardiovascular:      Rate and Rhythm: Normal rate and regular rhythm.      Pulses: Normal pulses.      Heart sounds: Normal heart sounds.   Pulmonary:      Effort: Pulmonary effort is normal. No respiratory distress.      Comments: Diminished, poor inspiratory effort. On RA  Abdominal:      General: Bowel sounds are normal. There is no distension.      Palpations: Abdomen is soft.      Tenderness: There is no abdominal tenderness.   Musculoskeletal:         General: No swelling or tenderness.      Comments: very minimal right hand . Otherwise does not follow commands.  Skin:     General: Skin is warm and dry.      Coloration: Skin is pale.      Findings: No bruising.   Neurological:      Comments: Nonverbal. Unable to follow commands although will squeeze with right hand. Unable to assess orientation. right hand tremor present.  Psychiatric:      Comments: Withdrawn. Nonresponsive.     Results Review:       I reviewed the patient's new clinical results.  Results from last  7 days   Lab Units 03/04/21  0626 03/03/21  1238   WBC 10*3/mm3 6.06 6.52   HEMOGLOBIN g/dL 14.8 13.1   PLATELETS 10*3/mm3 157 162     Results from last 7 days   Lab Units 03/04/21  0626 03/03/21  1238   SODIUM mmol/L 140 140   POTASSIUM mmol/L 3.8 3.3*   CHLORIDE mmol/L 100 97*   CO2 mmol/L 30.0* 31.2*   BUN mg/dL 21 11   CREATININE mg/dL 0.70 0.75   GLUCOSE mg/dL 79 100*   CrCl cannot be calculated (Unknown ideal weight.).  Results from last 7 days   Lab Units 03/04/21  0626 03/03/21  1238   ALBUMIN g/dL 3.30* 3.80   BILIRUBIN mg/dL 0.6 0.6   ALK PHOS U/L 78 89   AST (SGOT) U/L 75* 71*   ALT (SGPT) U/L 51* 49*     Results from last 7 days   Lab Units 03/04/21  0626 03/03/21  1238   CALCIUM mg/dL 8.2 8.9   ALBUMIN g/dL 3.30* 3.80   MAGNESIUM mg/dL 2.0 2.0     Results from last 7 days   Lab Units 03/04/21  0626   PROCALCITONIN ng/mL 0.09     Hemoglobin A1C   Date/Time Value Ref Range Status   03/04/2021 0626 5.80 (H) 4.80 - 5.60 % Final     Glucose   Date/Time Value Ref Range Status   03/04/2021 1117 73 70 - 130 mg/dL Final   03/04/2021 0733 72 70 - 130 mg/dL Final   03/04/2021 0630 79 70 - 130 mg/dL Final   03/04/2021 0028 101 70 - 130 mg/dL Final   03/03/2021 1611 92 70 - 130 mg/dL Final   03/03/2021 1314 108 70 - 130 mg/dL Final       ARIPiprazole, 2 mg, Oral, Daily  cholecalciferol, 1,000 Units, Oral, Daily  donepezil, 5 mg, Oral, Nightly  levETIRAcetam, 1,000 mg, Intravenous, Q12H  mirtazapine, 30 mg, Oral, Nightly  multivitamin with minerals, 1 tablet, Oral, Daily  nadolol, 40 mg, Oral, Daily  polyethylene glycol, 17 g, Oral, Daily  sertraline, 100 mg, Oral, Daily  sodium chloride, 10 mL, Intravenous, Q12H  valproate sodium, 500 mg, Intravenous, Q8H      Pharmacy Consult,   sodium chloride 0.9 % with KCl 20 mEq, 50 mL/hr, Last Rate: 50 mL/hr (03/03/21 1803)    NPO Diet       Assessment/Plan     Active Hospital Problems    Diagnosis  POA   • **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     Mrs. Laughlin is a 89 year old female who was directly admitted to the hospital with left sided weakness, altered mental status and tremors.     Complex partial status epilepticus  -CT head at outlying facility reported negative for acute findings. UA and CXR ordered.  -CXR with some atelectasis/effusion on left with inability to exclude developing infiltrate. No leukocytosis, fever, respiratory compromise. Will monitor closely. Procal normal.   -Neurology consulted. EEGs consistent with status eplipecticus. Medications being adjusted, but prognosis is grim. Await final neuro input.      Metabolic encephalopathy  -due to the above.      HTN  -BP up. Unable to tolerate oral medications. Will add PRN agent but await palliative decision first.     Hypokalemia  -Resolved.      Dementia  -Recently started on Aricept. Try to get outpatient records from Neurology.  -Monitor mental status. Resume home regimen for when able to take PO.     Elevated LFTs  -Mild. Unclear prior baseline. Will monitor for now.      · I discussed the patients findings and my recommendations with patient, family, nursing staff and Dr. Diaz.    Spoke with MADIHA Machuca. She will be stopping back by to speak with patient's daughter. I already informed daughter of poor prognosis and likely inability to return to prior baseline. She is lethargic, unresponsive, unable to eat. High risk aspiration and need for artifical nutrition. Anticipate palliative care transition as these are not in line with her wishes.     VTE Prophylaxis - SCDs.  Code  Status - No CPR.       MADIHA Polanco  [facility]  03/04/21  13:38 EST

## 2021-03-04 NOTE — CONSULTS
Acute rehab referral received via stroke order set. Patient noted to have been admitted from SNF - anticipate return to a similar level of care.    Thank you!    Thanh Mccoy RN  p

## 2021-03-05 NOTE — PROGRESS NOTES
Discharge Planning Assessment  [facility]     Patient Name: Manju Laughlin  MRN: [id number]  Today's Date: 3/5/2021    Admit Date: 3/3/2021    Discharge Needs Assessment    No documentation.       Discharge Plan     Row Name 03/05/21 1411       Plan    Plan Comments  The patient was transferred to [facility] from [facility] on 3/4/21. The patient is palliative. [facility] evaluated and admitted the patient to a [facility] scattered bed on 3/5/21. JEIMY Macias RN, CCP.    Final Discharge Disposition Code  51 - hospice medical facility    Final Note  Admitted to a [facility] scattered bed on 3/5/21. JEIMY Macias RN, CCP.        Continued Care and Services - Discharged on 3/5/2021 Admission date: 3/3/2021 - Discharge disposition: Still a Patient    Destination Coordination complete    Service Provider Request Status Selected Services Address Phone Fax Patient Preferred    [facility]   [facility] [address] [facility] [address] [phone] --    [facility]  Pending - Request Sent N/A [address] [place] [address] [phone] --                Demographic Summary    No documentation.       Functional Status    No documentation.       Psychosocial    No documentation.       Abuse/Neglect    No documentation.       Legal    No documentation.       Substance Abuse    No documentation.       Patient Forms    No documentation.           Usha Macias RN

## 2021-03-05 NOTE — DISCHARGE SUMMARY
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          Hospital Course:  Manju Laughlin is a 89 y.o. female with past medical history of dementia, anxiety, hypertension, debility, epilepsy presents to the hospital with left-sided weakness, tremors, metabolic encephalopathy and was found to be in complex partial status epilepticus.     She received treatment with the assistance of neurologic consult team.  She received multiple extensive antiepileptic drugs and Ativan for breakthrough and despite this aggressive treatment she did not have improvement in her seizures.  Case was discussed with neurology consult team at length and we both felt prognosis was poor.  We communicated this to her daughter at the bedside who ultimately decided to go comfort measures only.     Daughter has been counseled at length and confirms CODE STATUS with comfort measures only and plans for inpatient hospice.  I have communicated with hospice who plans to talk with family more today.  Greater than 35 minutes spent in chart review, coronation care,  and counseling.  20 minutes spent in direct counseling daughter regarding end-of-life care.     Disposition: Hospice end-of-life care       Day of Discharge     HPI:   See note    Vital Signs:   Temp:  [95.9 °F (35.5 °C)-96.9 °F (36.1 °C)] 96.9 °F (36.1 °C)  Heart Rate:  [49-62] 62  Resp:  [12-16] 12  BP: (145-161)/(54-58) 161/58     Physical Exam:  See todays note    Pertinent  and/or Most Recent Results     Results from last 7 days   Lab Units 03/04/21  0626 03/03/21  1238   WBC 10*3/mm3 6.06 6.52   HEMOGLOBIN g/dL 14.8 13.1   HEMATOCRIT % 44.4 38.8   PLATELETS 10*3/mm3 157 162   SODIUM mmol/L 140 140   POTASSIUM mmol/L 3.8 3.3*   CHLORIDE mmol/L 100 97*   CO2 mmol/L 30.0* 31.2*   BUN mg/dL 21 11   CREATININE mg/dL 0.70 0.75   GLUCOSE mg/dL 79 100*   CALCIUM mg/dL 8.2 8.9     Results from last 7 days   Lab Units 03/04/21  0626 03/03/21  1523 03/03/21  1238   BILIRUBIN mg/dL 0.6  --  0.6   ALK PHOS U/L 78  --  89   ALT (SGPT) U/L 51*  --  49*   AST (SGOT) U/L 75*  --  71*   PROTIME Seconds  --  13.4  --    INR   --  1.04  --      Results from last 7 days   Lab Units 03/04/21  0626   CHOLESTEROL mg/dL 219*   TRIGLYCERIDES mg/dL 121   HDL CHOL mg/dL 46     Results from last 7 days   Lab Units 03/04/21  0626 03/03/21  1238   HEMOGLOBIN A1C % 5.80*  --    TROPONIN T ng/mL  --  <0.010   PROCALCITONIN ng/mL 0.09  --        Brief Urine Lab Results  (Last result in the past 365 days)      Color   Clarity   Blood   Leuk Est   Nitrite   Protein   CREAT   Urine HCG        03/04/21 0923 Yellow Clear Negative Negative Negative Negative               Microbiology Results Abnormal     None          Imaging Results (All)     Procedure Component Value Units Date/Time    XR Chest 1 View [[id number]] Collected: 03/03/21 1337     Updated: 03/03/21 1346    Narrative:      ONE VIEW PORTABLE CHEST at 1:06 PM     HISTORY: Confusion. Hypertension.     FINDINGS: There are no prior exams for comparison. There is mild  cardiomegaly. The lungs  are moderately expanded with some slight  increased density at the left base suggesting some atelectasis and  pleural effusion. I cannot completely exclude minimal developing  infiltrate and continued follow-up evaluation may be helpful.     This report was finalized on 3/3/2021 1:38 PM by Dr. Laith Avila M.D.                                Allergies   Allergen Reactions   • Codeine Unknown - Low Severity   • Sulfa Antibiotics Unknown - Low Severity         Discharge Disposition:  Inpt hospice    Diet:  Hospital:  Diet Order   Procedures   • NPO Diet          CODE STATUS:    Code Status and Medical Interventions:   Ordered at: 03/04/21 1613     Level Of Support Discussed With:    Health Care Surrogate     Code Status:    No CPR     Medical Interventions (Level of Support Prior to Arrest):    Comfort Measures       Eduardo Diaz MD  03/05/21      Time Spent on Discharge:  I spent  45  minutes on this discharge activity which included: face-to-face encounter with the patient, reviewing the data in the system, coordination of the care with the nursing staff as well as consultants, documentation, and entering orders.

## 2021-03-05 NOTE — PROGRESS NOTES
[facility] Medicine Services  PROGRESS NOTE    Patient Name: Manju Laughlin  : 1928  MRN: [id number]    Date of Admission: 3/3/2021  Primary Care Physician: Etienne Grajeda MD    Subjective   Subjective     CC:  Follow-up seizures    HPI:  Patient continues to be unresponsive.  She is not able to give any history during my evaluation in the room.  No other new events reported.    Review of Systems  Unable to assess due to mental status    Objective   Objective     Vital Signs:   Temp:  [95.9 °F (35.5 °C)-97.2 °F (36.2 °C)] 96.9 °F (36.1 °C)  Heart Rate:  [49-62] 62  Resp:  [12-16] 12  BP: (145-196)/(54-82) 161/58  Total (NIH Stroke Scale): 19     Physical Exam:  Constitutional: Somnolent elderly and chronically ill-appearing  Eyes: Pupils equal, sclerae anicteric, no conjunctival injection  HENT: NCAT, mucous membranes moist  Neck: Supple, no thyromegaly, no lymphadenopathy, trachea midline  Respiratory: Decreased sounds at the bases, clear to auscultation bilaterally, nonlabored respirations   Cardiovascular: RRR, palpable radial pulses bilaterally  Gastrointestinal: Positive bowel sounds, soft, nontender, nondistended  Musculoskeletal: Very elderly and frail, some muscle wasting is noted, minimal edema  Psychiatric: Lethargic, but not distressed, not cooperative or conversational  Neurologic: Somnolent, left facial droop, nonverbal, moving right hand some twitching, not currently moving left side.  Not oriented  Skin: No rashes or jaundice       Results Reviewed:  Results from last 7 days   Lab Units 21  0626 21  1523 21  1238   WBC 10*3/mm3 6.06  --  6.52   HEMOGLOBIN g/dL 14.8  --  13.1   HEMATOCRIT % 44.4  --  38.8   PLATELETS 10*3/mm3 157  --  162   INR   --  1.04  --    PROCALCITONIN ng/mL 0.09  --   --      Results from last 7 days   Lab Units 21  0626 21  1238   SODIUM mmol/L 140 140   POTASSIUM mmol/L 3.8 3.3*   CHLORIDE mmol/L 100 97*   CO2 mmol/L 30.0*  31.2*   BUN mg/dL 21 11   CREATININE mg/dL 0.70 0.75   GLUCOSE mg/dL 79 100*   CALCIUM mg/dL 8.2 8.9   ALT (SGPT) U/L 51* 49*   AST (SGOT) U/L 75* 71*   TROPONIN T ng/mL  --  <0.010     CrCl cannot be calculated (Unknown ideal weight.).    Microbiology Results Abnormal     None          Imaging Results (Last 24 Hours)     ** No results found for the last 24 hours. **               I have reviewed the medications:  Scheduled Meds:levETIRAcetam, 1,000 mg, Intravenous, Q12H  sodium chloride, 10 mL, Intravenous, Q12H  valproate sodium, 500 mg, Intravenous, Q8H      Continuous Infusions:Pharmacy Consult,   sodium chloride 0.9 % with KCl 20 mEq, 50 mL/hr, Last Rate: 50 mL/hr (03/05/21 0646)      PRN Meds:.•  acetaminophen **OR** acetaminophen **OR** acetaminophen  •  diphenoxylate-atropine  •  enalaprilat  •  Glycerin-Hypromellose-  •  glycopyrrolate **OR** glycopyrrolate **OR** glycopyrrolate **OR** glycopyrrolate  •  LORazepam  •  Morphine  •  ondansetron  •  Pharmacy Consult  •  sodium chloride    Assessment/Plan   Assessment & Plan     Active Hospital Problems    Diagnosis  POA   • **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        Brief Hospital Course to date:  Manju Laughlin is a 89 y.o. female with past medical history of dementia, anxiety, hypertension, debility, epilepsy presents to the hospital with left-sided weakness, tremors, metabolic encephalopathy and was found to be in complex partial status epilepticus.    She received treatment with the assistance of neurologic consult team.  She  received multiple extensive antiepileptic drugs and Ativan for breakthrough and despite this aggressive treatment she did not have improvement in her seizures.  Case was discussed with neurology consult team at length and we both felt prognosis was poor.  We communicated this to her daughter at the bedside who ultimately decided to go comfort measures only.    Daughter has been counseled at length and confirms CODE STATUS with comfort measures only and plans for inpatient hospice.  I have communicated with hospice who plans to talk with family more today.  Greater than 35 minutes spent in chart review, coronation care, and counseling.  20 minutes spent in direct counseling daughter regarding end-of-life care.    Disposition: Hospice end-of-life care    CODE STATUS:   Code Status and Medical Interventions:   Ordered at: 03/04/21 1613     Level Of Support Discussed With:    Health Care Surrogate     Code Status:    No CPR     Medical Interventions (Level of Support Prior to Arrest):    Comfort Measures       Eduardo Diaz MD  03/05/21

## 2021-03-05 NOTE — PROGRESS NOTES
Case Management Discharge Note      Final Note: Admitted to a [facility] scattered bed on 3/5/21. JEIMY Macias RN, CCP.         Selected Continued Care - Discharged on 3/5/2021 Admission date: 3/3/2021 - Discharge disposition: Still a Patient    Destination Coordination complete    Service Provider Selected Services Address Phone Fax Patient Preferred    [facility]  Inpatient Hospice [address], [facility] [address] [phone] --          Durable Medical Equipment    No services have been selected for the patient.              Dialysis/Infusion    No services have been selected for the patient.              Home Medical Care    No services have been selected for the patient.              Therapy    No services have been selected for the patient.              Community Resources    No services have been selected for the patient.                       Final Discharge Disposition Code: 51 - hospice medical facility

## 2021-03-05 NOTE — CONSULTS
[facility] admit 3/5/21  [facility] ID : [id number]    ICD 10: G40.201 complex seizures    Patient is getting Keppra IV that cannot be given po due to pt unable to take po. Keppra IV is needed to control seizures and keep pt comfortable.    Met with pt dgtr at bedside and provided explanation of services and consents signed. Written material provided.    Thank you for the referral.    Shima Holcomb RN  [facility]  [phone]

## 2021-03-05 NOTE — CONSULTS
Requested to contact  for [facility].  I have reached out On-Call  and he is notifying  to come in.

## 2021-03-05 NOTE — PLAN OF CARE
Goal Outcome Evaluation:      Outcome Summary: Patient transferred to palliative with comfort care measures. SLP to sign off.

## 2021-03-05 NOTE — PROGRESS NOTES
Adult Nutrition  Assessment/PES    Patient Name:  Manju Laughlin  YOB: 1928  MRN: [id number]  Admit Date:  3/3/2021    Assessment Date:  3/5/2021    Comments:  Transitioning to palliative care. IVF continue at 50 cc/hr but remains NPO. RD to sign off.     Reason for Assessment     Row Name 03/05/21 0730          Reason for Assessment    Reason For Assessment  follow-up protocol         Nutrition/Diet History     Row Name 03/05/21 0730          Nutrition/Diet History    Typical Food/Fluid Intake  NPO, family requested palliative/comfort care with IV fluids to continue for now.           Labs/Tests/Procedures/Meds     Row Name 03/05/21 0731          Labs/Procedures/Meds    Lab Results Reviewed  reviewed        Diagnostic Tests/Procedures    Diagnostic Test/Procedure Reviewed  reviewed        Medications    Pertinent Medications Reviewed  reviewed             Nutrition Prescription Ordered     Row Name 03/05/21 0731          Nutrition Prescription PO    Current PO Diet  NPO         Evaluation of Received Nutrient/Fluid Intake     Row Name 03/05/21 0734          Fluid Intake Evaluation    IV Fluid (mL)  1200               Problem/Interventions:  Problem 1     Row Name 03/05/21 0734          Nutrition Diagnoses Problem 1    Etiology (related to)  Goals of Care     Resolved?  Yes               Intervention Goal     Row Name 03/05/21 0736          Intervention Goal    General  Palliative Care                 Electronically signed by:  Marilu Summers RD  03/05/21 07:36 EST

## 2021-03-05 NOTE — PLAN OF CARE
Goal Outcome Evaluation:  Plan of Care Reviewed With: patient  Progress: no change  Outcome Summary: Pt rested well. New IV placed in left AC. Family would like IV fluids continued for now. No s/s of discomforrt. Continue comfort care.

## 2021-03-06 PROBLEM — Z51.5 HOSPICE CARE PATIENT: Status: ACTIVE | Noted: 2021-01-01

## 2021-03-06 PROBLEM — D47.2 MONOCLONAL GAMMOPATHY OF UNDETERMINED SIGNIFICANCE: Status: ACTIVE | Noted: 2021-01-01

## 2021-03-06 PROBLEM — G31.1 SENILE DEGENERATION OF BRAIN (HCC): Status: ACTIVE | Noted: 2021-01-01

## 2021-03-06 NOTE — PROGRESS NOTES
[facility] Visit Report    Manju Laughlin  [id number]  3/6/2021    Admission R/T [facility] Dx: YES      Reason for [facility] Admission: Complex status epilepticus with metabolic encephalopathy      Symptom  Management: Pain and seizure precautions      Nursing/Medication Recommendations:Please contact [facility] at [phone] for any questions or concerns and continue to provide comfort care per orders.      Psychosocial Issues and Recommendations: Provide support to patient and family      Spiritual Concerns and Recommendations: None at present      [facility]  None, continue to monitor for rapid decline, no plans for transfer. Requires daily RN assessment for pain and for seizure precautions using IV medication scheduled and prn. Patient meeting criteria for GIP.      Review of Visit: Arrived on unit. Covid-19 screening completed. Spoke to staff ABELARDO Bermeo and reviewed Epic notes. Entered room and patient lying in bed, on her side, rails padded. Unresponsive to touch or to call of name, color flushed to ashen, nailbeds dusky. Breathing shallow, unlabored with 02 sat 98% on 2L. PPS 10%, oral care only. F/C to BSD with yellow urine noted. Close monitoring for safety, daily RN assessment, comfort care for rapidly declining patient who requires IV medication for symptom control to maintain comfort. Spoke to son at bedside regarding rapid decline and he is aware and accepting. Emotional support provided and encouraged him to contact [facility] 24/7 for any questions or concerns. Discussed care needs with staff ABELARDO Bermeo and patient is receiving scheduled IV Keppra 100mg q12hrs for seizures, has remained comfortable so far without IV narcotics, monitoring frequently. Will continue to see daily to assess needs, monitor status and offer support.        Nancy Patten, RN  [facility] scattered bed nurse

## 2021-03-06 NOTE — PLAN OF CARE
Goal Outcome Evaluation:Pt VSS, afebrile, responds to pain,  pt changed to hospice scattered bed, family at bedside, Q4hour turn, accumax, Will cont. To monitor.

## 2021-03-06 NOTE — H&P
Palliative Care/Hospice Admit/Consult Note       Referring Provider: Eduardo Diaz MD  Reason for Consultation: Hospice care  Date of Admission:  3/5/2021    Patient Care Team:  Etienne Grajeda MD as PCP - General (Family Medicine)  Joselito Hammer MD as Attending Provider (Hospice and Palliative Medicine)    Chief complaint: Complex seizures    History of present illness:  The patient is a 89 y.o. female who has a past medical history of dementia, anxiety, hypertension, debility, and epilepsy who presented to the hospital 3/3/2021 with left-sided weakness, tremors, and metabolic encephalopathy who was found to be in complex partial status epilepticus.     She received treatment with the assistance of neurologic consult team.  She received multiple antiepileptic drugs and Ativan for breakthrough and despite this aggressive treatment she did not have improvement in her seizures.  Case was discussed with neurology consult team at length and all felt prognosis was poor.  Discussions concerning poor prognosis communicated to the daughter who decided on comfort measures only.     Daughter was counseled and confirmed CODE STATUS with comfort measures only and plans for inpatient hospice.  Hospice evaluated the patient and all agreed to discharge her from acute care and readmit her as a hospice scattered bed patient.  I was asked to assume her care.    At the time of my evaluation, I discussed with the patient's daughter-in-law.  She got here about 10:30 PM last night and stayed the night.  I reviewed with the RN.  At the time of my examination, the patient was not awake and had a bed elevated about 20 degrees.  The patient was not awake and no ROS obtainable.    Review of Systems  Pertinent items are noted in HPI    Palliative Performance Scale  Palliative Performance Scale Score: 10%  [place] Symptom Assessment System Revised  Pain Score: no pain   ESAS Tiredness Score: Worst possible  tiredness  ESAS Nausea Score: No nausea  ESAS Depression Score: No depression  ESAS Anxiety Score: No anxiety  ESAS Drowsiness Score: Worst possible drowsiness  ESAS Lack of Appetite Score: Worst lack of appetite  ESAS Wellbeing Score: 3  ESAS Dyspnea Score: 3  ESAS Other Problem Score: unable to assess  ESAS Source of Information: patient, family caregiver, healthcare professional caregiver  ESAS Intervention: medicated/see MAR  ESAS Intervention Response: tolerated    History  Past Medical History:   Diagnosis Date   • Anxiety    • Hypertension    • Monoclonal gammopathy    • Seizure (CMS/HCC)    , No past surgical history on file., No family history on file. and   Social History     Socioeconomic History   • Marital status:      Spouse name: Not on file   • Number of children: Not on file   • Years of education: Not on file   • Highest education level: Not on file   Tobacco Use   • Smoking status: Never Smoker   • Smokeless tobacco: Never Used     E-cigarette/Vaping     E-cigarette/Vaping Substances     E-cigarette/Vaping Devices         Allergy  Codeine and Sulfa antibiotics    Vital Signs   Temp:  [97.5 °F (36.4 °C)-97.6 °F (36.4 °C)] 97.6 °F (36.4 °C)  Heart Rate:  [72-80] 72  Resp:  [12-14] 12  BP: (153-177)/() 177/82  Device (Oxygen Therapy): nasal cannulaFlow (L/min):  [2] 2SpO2:  [89 %-98 %] 98 %    Physical Exam:  General Appearance:   Not awake and appears in no acute distress lying on her back with head of bed elevated 20 degrees, chronically ill-appearing elderly female   Head:    Normocephalic, without obvious abnormality, atraumatic   Eyes:            Lids and lashes normal, conjunctivae and sclerae normal,   no icterus   Ears:    Ears appear intact with no abnormalities noted   Throat:   No oral lesions, oral mucosa moist   Neck:   No adenopathy, supple, trachea midline, no thyromegaly   Back:     No scoliosis present   Lungs:     Clear to auscultation with slight coarse inspiratory  sounds, respirations with slight increase inspiratory effort     Heart:    Regular rhythm and normal rate   Breast Exam:    Deferred   Abdomen:   Occasional bowel sounds, soft and non-tender, non-distended   Genitalia:    Deferred   Extremities:  No edema, pale and no cyanosis    Pulses:  Radial pulses palpable and equal bilaterally   Skin:   No bleeding         Neurologic:  Not awake to test      Results Review:   I reviewed the patient's new clinical results.      Impression:      Status epilepticus due to complex partial seizure (CMS/HCC)    Senile degeneration of brain (CMS/HCC)    Hospice care patient    Seizure disorder (CMS/HCC)    Monoclonal gammopathy of undetermined significance    Hypertension    Elevated LFTs        Plan:  I reviewed the patient's previous admission and her medical records.  I reviewed with the patient's daughter-in-law.  I reviewed with the RN.  With medications being administered, the patient appeared comfortable at the time of my examination.  All p.o. medications discontinued.  The patient's continues to receive 1000 mg IV Keppra every 12 hours.  Thus far today, no other medicines required.  No attempts at resuscitation will be made.  I answered all of the patient's daughter-in-law's questions.      Joselito Hammer MD  Hospice and Palliative Medicine  03/06/21  08:53 EST

## 2021-03-06 NOTE — PLAN OF CARE
Goal Outcome Evaluation:  Plan of Care Reviewed With: patient, other (see comments) (daughter-inlaw)  Progress: no change  Outcome Summary: Pt appears to be resting well. RN educated daughter in-law on palliative care goals and reviewed the options of premedicating prior to repositionings. Family will be in today to discuss this further. Continue IV keppra and monitor for comfort.

## 2021-03-06 NOTE — NURSING NOTE
Pts daughter Faviola Grajeda called and I gave her an update that pt is only receiving IV Keppra at this time, no need for anxiety or pain medications. She states she is okay with us giving her pain and anxiety medication if she needs it. She updated her phone number with me and wants any medication or medical changes to be made through her. She thanked me for our care and said she will call back if she has any other questions.

## 2021-03-07 NOTE — PLAN OF CARE
Goal Outcome Evaluation:  Plan of Care Reviewed With: patient  Progress: declining  Outcome Summary: Pt rested well throughout the shift. IV Keppra continued. Prince continued. Pt premedicated prior to turns and activity with 0.5 mg Ativan and 2 mg Morphine with good effect. Family at the bedside throughout the shift, we discussed goals of care and pt vital signs. They verbalized understanding. Will continue to monitor vital signs and comfort.

## 2021-03-07 NOTE — PLAN OF CARE
Goal Outcome Evaluation:  Plan of Care Reviewed With: patient  Progress: declining  Outcome Summary: Pt rested comfortably throughout the shift. IV keppra continued. Pt rested comfortably, grimaced and looked uncomfortable, family agreeable to premedicating prior to turns. Night shift informed. Will continue to monitor.

## 2021-03-07 NOTE — PLAN OF CARE
Goal Outcome Evaluation:  Plan of Care Reviewed With: patient  Progress: declining  Outcome Summary: Pt rested well with family at bedside. Medicated for comfort prior to Q4 turns. Continue comfort care.

## 2021-03-07 NOTE — NURSING NOTE
Daughters and son in law at the bedside, we discussed the medications the pt is on and the goals of comfort care. Family verbalized they want the patient to remain comfortable. Pts wedding ring given to daughter for her to take home. No other jewelry seen.

## 2021-03-07 NOTE — PROGRESS NOTES
[facility] Visit Report    Manju Laughlin  [id number]  3/7/2021    Admission [facility] Dx: YES      Reason for [facility] Admission: Complex status epilepticus with metabolic encephalopathy      Symptom  Management: Pain and seizure precautions      Nursing/Medication Recommendations: Please contact [facility] at [phone] for any questions or concerns and continue to provide comfort care per orders.      Psychosocial Issues and Recommendations: Provide support to patient and family      Spiritual Concerns and Recommendations: None at present      [facility] Discharge Plans:  None, patient in active dying process and is not safe for transport. Requires daily RN assessment for symptom management of pain and for seizure precautions using scheduled and prn IV medications. Patient meeting criteria for GIP.      Review of Visit: Arrived on unit. Covid-19 screening completed. Spoke to staff ABELARDO Bermeo and reviewed Epic notes. Entered room and patient lying in bed, on her side. Unresponsive to touch or to call of name. Color ashen to slightly flushed, nailbeds dusky. Breathing shallow, unlabored with 02 sat 96% on 2L. PPS 10%, oral care only. VS 97.9-71-8-80/47. F/C to BSD with dark yellow urine noted. Close monitoring for safety, daily RN assessment required, comfort care for patient in dying process who requires IV medication scheduled and prn for symptom management to maintain comfort. Spoke to daughter at bedside regarding imminent status and she is aware and accepting, grateful for the care patient receiving. Emotional support provided and encouraged her to contact [facility] 24/7 for any questions or concerns. Discussed care needs with staff ABELARDO Bermeo and patient has received IV Morphine 2mg and IV Ativan 0.5mg x 5 doses each in the last 24 hours. Also receiving scheduled IV Keppra 1000mg q12 hours. Patient appears comfortable and no seizures noted with  IV administration of medication. Will continue to see daily to assess needs, monitor status and offer support.        Nancy Patten RN  [facility] scattered bed nurse

## 2021-03-07 NOTE — PROGRESS NOTES
Palliative Care/Hospice Follow Up Note       LOS: 2 days   Patient Care Team:  Etienne Grajeda MD as PCP - General (Family Medicine)  Joselito Hammer MD as Attending Provider (Hospice and Palliative Medicine)    Chief Complaint:  Complex seizures    Interval History:     Patient Complaints: None  Patient Denies:  None  History taken from: I reviewed with the patient's daughter; I reviewed with the RN; hospice RN note from yesterday reviewed    Review of Systems:  As above.    Palliative Performance Scale  Palliative Performance Scale Score: 10%  [place] Symptom Assessment System Revised  Pain Score: 1   ESAS Tiredness Score: Worst possible tiredness  ESAS Nausea Score: No nausea  ESAS Depression Score: No depression  ESAS Anxiety Score: No anxiety  ESAS Drowsiness Score: Worst possible drowsiness  ESAS Lack of Appetite Score: Worst lack of appetite  ESAS Wellbeing Score: 3  ESAS Dyspnea Score: No shortness of breath  ESAS Other Problem Score: unable to assess  ESAS Source of Information: healthcare professional caregiver  ESAS Intervention: medicated/see MAR  ESAS Intervention Response: tolerated    Vital Signs  Temp:  [97.9 °F (36.6 °C)-98.5 °F (36.9 °C)] 97.9 °F (36.6 °C)  Heart Rate:  [71-80] 71  Resp:  [8-12] 8  BP: ()/(47-80) 80/47  Device (Oxygen Therapy): nasal cannulaFlow (L/min):  [2] 2SpO2:  [94 %-96 %] 96 %    Physical Exam:  General Appearance:    Not awake and in no acute distress lying on her left side, chronically ill-appearing elderly female   Throat:   No oral lesions, oral mucosa somewhat moist   Neck:   No adenopathy, supple, trachea midline   Lungs:     Clear to auscultation, respirations diminished and not    labored    Heart:    Regular rhythm and normal rate   Abdomen:     Occasional bowel sounds, soft and non-tender, non-distended   Extremities:   No edema, pale and no cyanosis   Pulses:   Radial pulses palpable and equal bilaterally          Results Review:     I  reviewed the patient's new clinical results.    Medication Reviewed.    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 I reviewed with the RN. With medications being administered, the patient appears comfortable.  The patient continues to receive IV Keppra every 12 hours.  The patient has required 3 doses of 2 mg morphine and 3 doses of 0.5 mg Ativan thus far today.  Medications will be continued and adjusted as needed for symptom management for comfort.  I answered all of the patient's daughters questions.    Plan for disposition:  GURVINDER Hammer MD  Hospice and Palliative Medicine  03/07/21  10:37 EST

## 2021-03-08 NOTE — PROGRESS NOTES
Palliative Care/Hospice Follow Up Note       LOS: 3 days   Patient Care Team:  Etienne Grajeda MD as PCP - General (Family Medicine)  Joselito Hammer MD as Attending Provider (Hospice and Palliative Medicine)    Chief Complaint:  Complex seizures    Interval History:     Patient Complaints: None  Patient Denies:  None  History taken from: I reviewed with the patient's daughter; I reviewed with the RN; hospice RN note from today reviewed    Review of Systems:  As above.    Palliative Performance Scale  Palliative Performance Scale Score: 10%  [place] Symptom Assessment System Revised  Pain Score: no pain   ESAS Tiredness Score: Worst possible tiredness  ESAS Nausea Score: No nausea  ESAS Depression Score: unable to assess  ESAS Anxiety Score: No anxiety  ESAS Drowsiness Score: Worst possible drowsiness  ESAS Lack of Appetite Score: Worst lack of appetite  ESAS Wellbeing Score: unable to assess  ESAS Dyspnea Score: No shortness of breath  ESAS Other Problem Score: unable to assess  ESAS Source of Information: family caregiver, healthcare professional caregiver  ESAS Intervention: medicated/see MAR  ESAS Intervention Response: tolerated    Vital Signs  Temp:  [98.3 °F (36.8 °C)-98.9 °F (37.2 °C)] 98.3 °F (36.8 °C)  Heart Rate:  [] 117  Resp:  [8-16] 16  BP: ()/(54-63) 102/63  Device (Oxygen Therapy): room airFlow (L/min):  [2] 2SpO2:  [90 %-95 %] 90 %    Physical Exam:  General Appearance:    Not awake and in no acute distress lying on her right side, chronically ill-appearing elderly female   Throat:   No oral lesions, oral mucosa somewhat moist   Neck:   No adenopathy, supple, trachea midline   Lungs:     Clear to auscultation with mild scattered upper airway expiratory rhonchi, respirations diminished and not labored    Heart:    Regular rhythm and tachycardia    Abdomen:     Occasional bowel sounds, soft and non-tender, non-distended   Extremities:   No edema, pale and no cyanosis    Pulses:   Radial pulses palpable and equal bilaterally          Results Review:     I reviewed the patient's new clinical results.    Medication Reviewed.    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 I reviewed with the RN. With medications being administered, the patient appears comfortable.  The patient continues to receive IV Keppra every 12 hours.  The patient has required 3 doses of 2 mg morphine, 6 doses yesterday, and 3 doses of 0.5 mg Ativan, 6 doses yesterday, thus far today.  Medications will be continued and adjusted as needed for symptom management for comfort.  Slow gradual decline evident.  I answered all of the patient's daughters questions.    Plan for disposition:  GURVINDER Hammer MD  Hospice and Palliative Medicine  03/08/21  18:08 EST

## 2021-03-08 NOTE — PROGRESS NOTES
[facility] Visit Report    Manju Laughlin  [id number]  3/8/2021    Admission [facility]T [facility] Dx: Yes    Reason for [facility] Admission: Complex status epilepticus    Symptom  Management: Pain, congestion and seizures    Nursing/Medication Recommendations: Continue to monitor for signs of decline and provide comfort measures. Contact [facility] at [phone] with any questions or concerns.     Psychosocial Issues and Recommendations:    Spiritual Concerns and Recommendations:    [facility] Discharge Plans:  No plans to discharge at this time. Patient continues to require frequent IV medication administration and titration to achieve and maintain symptom management.     Review of Visit: Reviewed medications, notes, and VS in Epic and discussed patient condition with ABELARDO Orlando. Patient is unresponsive, bed bound, oral care only, PPS 10%, appears imminent. Respirations are shallow with congestion and 10-15 second periods of apnea noted. Abdomen is soft with hypoactive bowel sounds. Color is pale to ashen with mild mottling to the hands and feet and dusky nail beds. F/C to BSD with 200mL of farzad UOP documented in the past 24 hours. Patient required IV Robinul 0.2mg x1, IV Morphine 2mg x6, IV Ativan 0.5mg x6, and IV Keppra 1000mg BID in the past 24 hours for symptom management of pain, congestion, and seizures. Patient noted with no signs of pain or distress d/t the frequent administration of IV medications. Spoke with patients family at the bedside with condition update given, all V/U. Patient continues to require frequent IV medication doses and titration to achieve and maintain symptom management. Will continue [facility] RN visits to monitor for changes, assess needs, and provide support.         Lynne Bowen RN

## 2021-03-08 NOTE — PROGRESS NOTES
[facility] Visit Report    Manju Laughlin  [id number]  3/8/2021        Review of Visit (Include All Collaboration- including names of hospital and family involved during admission/visit):  [facility] MSW completed joint in-person initial eval with [facility] . Pt laying on right side, with daughter Joesph holding her hand. Additional family member sleeping on sofa. No pain or distress noted during visit. Pt with 6 children total. All have either visited, plan to visit or has had closure via phone. Family appears to be strong in adryan and accepting of prognosis. Final arrangements completed at [facility] in [place]. Reviewed and encouraged grief counseling. Plan is for pt to remain in [place] in hospital. SW available as needed.         SIL Hamilton

## 2021-03-08 NOTE — PROGRESS NOTES
\[facility] [place] [facility]\"" [facility]  Visit Report    Manju Laughlin  [id number]  3/8/2021      Review of Visit (Include All Collaboration- including names of hospital and family involved during admission/visit):  [facility]BT SW and CHP completed initial assessment, pt daughter Joesph present with pt, grandson present but sleeping; pt non-responsive, audible congestion, Joesph reported MD had rounded a short time ago and was going to initiate Robinul, Joesph very pleased with care pt is receiving; Joesph has 5 siblings who are all actively involved, 2 of those live in outlying states but are staying well informed daily; pt and family are [employer]od[employer], strongly invested in their adryan, no specific local Episcopal mentioned, pt to be buried with [employer] in [place],  plans are also prepaid in [place] through [place]  [facility]; no concerns voiced, grief counseling availability and self care also discussed.      Low Gonzales, BCC

## 2021-03-08 NOTE — PLAN OF CARE
Goal Outcome Evaluation:  Plan of Care Reviewed With: daughter  Progress: declining  Outcome Summary: Appears comfortable. MEdicated q4 prior to turns. Daughter at bedside. Will monitor

## 2021-03-09 NOTE — PROGRESS NOTES
[facility] Visit Report    Manju Laughlin  [id number]  3/9/2021    Admission R/T [facility] Dx: yes    Reason for [facility] Admission:Complex status epilepticus    Symptom  Management: pain, seizures and congestion    Nursing/Medication Recommendations:nothing at this time    Psychosocial Issues and Recommendations:    Spiritual Concerns and Recommendations:    [facility] Discharge Plans:  Nothing at this time, patient continues to need frequent IV medications and adjustments for comfort and symptom management    Review of Visit (Include All Collaboration- including names of hospital and family involved during admission/visit):RN arrived on the unit and received report from ABELARDO Orlando. RN also reviewed Epic notes. RN arrived at bedside. Daughter is at bedside. Patient is laying in a recovery position on her right side. Patient is unresponsive during the visit. PPS is at 10%. Faint audible congestion is present. Apnea is present for up to 10 seconds multiple times during the visit. Respirations are shallow with accessory muscle usage. Color is ashen. Nailbeds are dusky. Urine is farzad with minimal output. Patient is actively dying and is showing no signs of distress or discomfort during the visit. RN reviewed patient's declining condition along with medications patient is receiving. Daughter verbalizes understanding with comfort being the goal for her mother. She has no questions, concerns or issues at this time.        Claude Segura RN

## 2021-03-09 NOTE — PLAN OF CARE
Problem: Adult Inpatient Plan of Care  Goal: Plan of Care Review  Outcome: Ongoing, Progressing  Flowsheets (Taken 3/8/2021 1929)  Progress: declining  Plan of Care Reviewed With:   daughter   family  Outcome Summary: Patient medicated prior to turns for symptom management - robinul added for noted congestion. Patient placed in recovery position to aid with congestion. Family at bedside supportive of care and were updated on patient's status. Will continue to monitor per palliative goals of care.

## 2021-03-09 NOTE — PLAN OF CARE
Goal Outcome Evaluation:  Plan of Care Reviewed With: daughter  Progress: declining  Outcome Summary: Appears comfortable with q4 premedication for turns. Some secretions, continued robinul. Daughter at bedside through the night. Will continue palliative care

## 2021-03-10 NOTE — PLAN OF CARE
Problem: Adult Inpatient Plan of Care  Goal: Plan of Care Review  Outcome: Ongoing, Progressing  Flowsheets (Taken 3/9/2021 1925)  Progress: declining  Plan of Care Reviewed With: daughter  Outcome Summary: Patient medicated prior to turns for symptom management - robinul dosage increased for noted congestion. Patient's daughter at bedside/family friends visited. Daughter verbalized understanding of patient's status. Will continue to monitor per palliative goals of care.

## 2021-03-10 NOTE — PROGRESS NOTES
[facility] Visit Report    Manju Laughlin  [id number]  3/10/2021    Admission R/T [facility] Dx: yes    Reason for [facility] Admission:Complex status epilepticus    Symptom  Management: pain, restlessness, seizures and congestion    Nursing/Medication Recommendations:nothing at this time, morphine has been increased to 4mg and ativan is being increased to 1mg  Psychosocial Issues and Recommendations:    Spiritual Concerns and Recommendations:    [facility] Discharge Plans:  Nothing at this time, patient is actively dying and is unsafe for transfer    Review of Visit (Include All Collaboration- including names of hospital and family involved during admission/visit):RN arrived on the unit and received report from ABELARDO Vargas. RN also reviewed Epic notes. RN arrived at bedside. Daughter is at bedside. Patient is laying in a recovery position on her right side. Patient is unresponsive during the visit. PPS is at 10%. Audible congestion is present. No signs of apnea today. Respirations are shallow with accessory muscle usage. Color is ashen. Nailbeds are cyanotic. Mottling is present to bilateral feet and hands. Urine is farzad with minimal output. Patient is actively dying and is showing no signs of distress or discomfort during the visit. RN reviewed patient's declining condition along with increases and changes to medications since yesterday's visit. Daughter verbalizes understanding with comfort being the goal for her mother. She has no questions, concerns or issues at this time.        Claude Segura RN

## 2021-03-10 NOTE — PLAN OF CARE
Goal Outcome Evaluation:  Plan of Care Reviewed With: daughter  Progress: declining  Outcome Summary: Appears comfortable at rest. Breathing shallow but unlabored. Robinul, ativan, and morphine prior to turns. Daughter at bedside tonight. Will continue comfort care

## 2021-03-10 NOTE — PROGRESS NOTES
Palliative Care/Hospice Follow Up Note       LOS: 4 days   Patient Care Team:  Etienne Grajeda MD as PCP - General (Family Medicine)  Joselito Hammer MD as Attending Provider (Hospice and Palliative Medicine)    Chief Complaint:  Complex seizures    Interval History:     Patient Complaints: None  Patient Denies:  None  History taken from: I reviewed with the patient's daughter; I reviewed with the RN; hospice RN note from today reviewed    Review of Systems:  As above.    Palliative Performance Scale  Palliative Performance Scale Score: 10%  [place] Symptom Assessment System Revised  Pain Score: no pain   ESAS Tiredness Score: Worst possible tiredness  ESAS Nausea Score: No nausea  ESAS Depression Score: unable to assess  ESAS Anxiety Score: No anxiety  ESAS Drowsiness Score: Worst possible drowsiness  ESAS Lack of Appetite Score: Worst lack of appetite  ESAS Wellbeing Score: unable to assess  ESAS Dyspnea Score: No shortness of breath  ESAS Other Problem Score: unable to assess  ESAS Source of Information: family caregiver, healthcare professional caregiver  ESAS Intervention: medicated/see MAR  ESAS Intervention Response: tolerated    Vital Signs  Temp:  [98.8 °F (37.1 °C)-100.6 °F (38.1 °C)] 100.6 °F (38.1 °C)  Heart Rate:  [108-144] 108  Resp:  [14-16] 16  BP: (125-129)/(68-87) 125/68  Device (Oxygen Therapy): room air SpO2:  [87 %-90 %] 87 %    Physical Exam:  General Appearance:    Not awake and in no acute distress lying on her left side, chronically ill-appearing elderly female   Throat:   No oral lesions, oral mucosa somewhat moist   Neck:   No adenopathy, supple, trachea midline   Lungs:     Clear to auscultation with minimal scattered upper airway expiratory rhonchi, respirations diminished and not labored, occasional pause noted    Heart:    Regular rhythm and tachycardia    Abdomen:     Occasional bowel sounds, soft and non-tender, non-distended   Extremities:   No edema, pale and some  cyanosis   Pulses:   Radial pulses palpable and equal bilaterally          Results Review:     I reviewed the patient's new clinical results.    Medication Reviewed.    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 I reviewed with the RN. With medications being administered, the patient appears comfortable.  The patient continues to receive IV Keppra every 12 hours.  The patient has received glycopyrrolate for airway congestion.  4he patient has required 3 doses of 2 mg morphine, 6 doses yesterday, and 4 doses of 0.5 mg Ativan, 6 doses yesterday, thus far today.  Medications will be continued and adjusted as needed for symptom management for comfort.  Slow gradual decline evident.  I answered all of the patient's daughters questions.    Plan for disposition:  GURVINDER Hammer MD  Hospice and Palliative Medicine  03/09/21  19:43 EST

## 2021-03-10 NOTE — PLAN OF CARE
Goal Outcome Evaluation:     Progress: declining  Outcome Summary: PPS 10%, actively dying. Pt is unresponsive. Breathing shallow and labored at times. Pt with audible tracheal secretions. Pt receives morphine, ativan and robinul prior to cares. Ativan increased to 1mg today d/t increased work of breathing. Pt appears calm and comfortable in recovery position. Pt's dtr at bedside and aware and accepting of Pt's current status. No needs at this time.

## 2021-03-11 NOTE — PLAN OF CARE
Goal Outcome Evaluation:     Progress: declining  Outcome Summary: Pt premedicated prior to turns with 4mg morphine, 2mg ativan, and 0.4mg robinul. Unresponsive. IV keppra per MD order. Family at bedside and updated on care. Will cont to monitor

## 2021-03-11 NOTE — PROGRESS NOTES
Palliative Care/Hospice Follow Up Note       LOS: 5 days   Patient Care Team:  Etienne Grajeda MD as PCP - General (Family Medicine)  Joeslito Hammer MD as Attending Provider (Hospice and Palliative Medicine)    Chief Complaint:  Complex seizures    Interval History:     Patient Complaints: None  Patient Denies:  None  History taken from: I reviewed with the patient's daughter; I reviewed with the RN; hospice RN note from today reviewed    Review of Systems:  As above.    Palliative Performance Scale  Palliative Performance Scale Score: 10%  [place] Symptom Assessment System Revised  Pain Score: no pain   ESAS Tiredness Score: Worst possible tiredness  ESAS Nausea Score: No nausea  ESAS Depression Score: unable to assess  ESAS Anxiety Score: No anxiety  ESAS Drowsiness Score: Worst possible drowsiness  ESAS Lack of Appetite Score: Worst lack of appetite  ESAS Wellbeing Score: 6  ESAS Dyspnea Score: No shortness of breath  ESAS Other Problem Score: 3 (congestion)  ESAS Source of Information: healthcare professional caregiver  ESAS Intervention: medicated/see MAR  ESAS Intervention Response: tolerated    Vital Signs  Temp:  [98 °F (36.7 °C)-101 °F (38.3 °C)] 101 °F (38.3 °C)  Heart Rate:  [110-162] 110  Resp:  [12-14] 12  BP: (109)/(65) 109/65  Device (Oxygen Therapy): room airFlow (L/min):  [2] 2SpO2:  [76 %-80 %] 76 %    Physical Exam:  General Appearance:    Not awake and in no acute distress lying on her left side, chronically ill-appearing elderly female   Throat:   No oral lesions, oral mucosa moist, drooling   Neck:   No adenopathy, supple, trachea midline   Lungs:     Clear to auscultation with mild scattered upper airway expiratory rhonchi, respirations diminished and not labored, occasional pause noted    Heart:    Regular rhythm and tachycardia    Abdomen:     Occasional bowel sounds, soft and non-tender, non-distended   Extremities:   No edema, legs and feet warm, pale/ashen and cyanosis  evident   Pulses:   Radial pulses palpable and equal bilaterally          Results Review:     I reviewed the patient's new clinical results.    Medication Reviewed.    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 I reviewed with the RN. With medications being administered, the patient appears comfortable.  The patient continues to receive IV Keppra every 12 hours.  The patient has received glycopyrrolate for airway congestion.  The patient has required 1 dose of 2 mg and 3 doses of 4 mg morphine, 6 doses yesterday, and 3 doses of 0.5 mg and 1 dose of 1 mg Ativan, 6 doses yesterday, thus far today.  Medications will be continued and adjusted as needed for symptom management for comfort.  Slow gradual decline evident.  I answered all of the patient's daughters questions.    Plan for disposition:  GURVINDER Hammer MD  Hospice and Palliative Medicine  03/10/21  19:50 EST

## 2021-03-11 NOTE — PLAN OF CARE
Problem: Adult Inpatient Plan of Care  Goal: Plan of Care Review  Outcome: Ongoing, Progressing  Flowsheets (Taken 3/11/2021 0505)  Progress: declining  Plan of Care Reviewed With:   patient   daughter  Outcome Summary: premedicated prior to turns with 4 mg of morphine, 1 mg of ativan and 0.4 mg of robinul. suction and scope patch applied for congestion. recovery position. IV keppra given as order. john and oral care done. q4 turns. will continue to monitor and keep comfortable     Problem: Adult Inpatient Plan of Care  Goal: Patient-Specific Goal (Individualized)  Outcome: Ongoing, Progressing  Flowsheets (Taken 3/11/2021 0505)  Patient-Specific Goals (Include Timeframe): to keep comfortable  Individualized Care Needs: Premedicate prior to turns, john and oral care, q4 turns. IV keppra, suction and recovery

## 2021-03-11 NOTE — PROGRESS NOTES
[facility] Visit Report    Manju Laughlin  [id number]  3/11/2021    Admission R/T [facility] Dx: yes    Reason for [facility] Admission:Complex status epilepticus    Symptom  Management: pain, seizures, restlessness and congestion    Nursing/Medication Recommendations:nothing at this time    Psychosocial Issues and Recommendations:    Spiritual Concerns and Recommendations:    [facility] Discharge Plans:  Nothing at this time, patient is actively dying and appears imminent at this visit    Review of Visit (Include All Collaboration- including names of hospital and family involved during admission/visit):RN arrived on the unit and received report from ABELARDO Cifuentes. RN also reviewed Epic notes. RN arrived at bedside. Multiple family members are at bedside. Patient is laying in a recovery position on her right side. Patient is unresponsive during the visit. PPS is at 10%. Audible congestion is present and louder today. Cheyne-Ruiz breathing is present. Face and neck accessory muscle usage is present with each respiration. Color is ashen. Nailbeds are cyanotic. Mottling is present to bilateral feet and hands. Extremities are very cold today. Urine is farzad with minimal output. Patient is actively dying and is showing no signs of distress or discomfort during the visit. RN reviewed patient's declining condition with significant changes from yesterday's visit. Family verbalizes understanding with comfort being the goal for their mother. They have no questions, concerns or issues at this time.        Claude Segura RN

## 2021-03-12 NOTE — PLAN OF CARE
Goal Outcome Evaluation:  Plan of Care Reviewed With: patient, daughter  Progress: declining  Outcome Summary: Pt rested well. New IV placed at the start of shift. Medicated for comfort prior to Q4 turns. Continue comfort care.

## 2021-03-12 NOTE — PROGRESS NOTES
Palliative Care/Hospice Follow Up Note       LOS: 7 days   Patient Care Team:  Etienne Grajeda MD as PCP - General (Family Medicine)  Joselito Hammer MD as Attending Provider (Hospice and Palliative Medicine)    Chief Complaint:  Complex seizures    Interval History:     Patient Complaints: None  Patient Denies:  None  History taken from: I reviewed with the patient's daughter; I reviewed with the RN; hospice RN note from today reviewed    Review of Systems:  As above.    Palliative Performance Scale  Palliative Performance Scale Score: 10%  [place] Symptom Assessment System Revised  Pain Score: no pain   ESAS Tiredness Score: Worst possible tiredness  ESAS Nausea Score: No nausea  ESAS Depression Score: unable to assess  ESAS Anxiety Score: No anxiety  ESAS Drowsiness Score: Worst possible drowsiness  ESAS Lack of Appetite Score: Worst lack of appetite  ESAS Wellbeing Score: unable to assess  ESAS Dyspnea Score: 1  ESAS Other Problem Score: unable to assess  ESAS Source of Information: healthcare professional caregiver  ESAS Intervention: medicated/see MAR  ESAS Intervention Response: tolerated    Vital Signs  Temp:  [99.8 °F (37.7 °C)-101.9 °F (38.8 °C)] 99.8 °F (37.7 °C)  Heart Rate:  [] 95  Resp:  [5-8] 5  BP: (86)/(61) 86/61  Device (Oxygen Therapy): room air SpO2:  [70 %-86 %] 86 %    Physical Exam:  General Appearance:    Not awake and in no acute distress lying on her left side, chronically ill-appearing elderly female   Throat:   No oral lesions, oral mucosa somewhat moist   Neck:   No adenopathy, supple, trachea midline   Lungs:     Clear to auscultation with minimal scattered rhonchi, respirations diminished with low volumes and not labored, pause noted    Heart:    Regular rhythm and regular rate    Abdomen:     Occasional bowel sounds, soft and non-tender, non-distended   Extremities:   No edema, legs and feet with some mottling, pale/ashen and cyanosis evident   Pulses:   Radial  pulses palpable and equal bilaterally          Results Review:     I reviewed the patient's new clinical results.    Medication Reviewed.    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 I reviewed with the RN. With medications being administered, the patient appears comfortable.  The patient's IV Keppra discontinued due to IV access problems.  The patient has received glycopyrrolate for airway congestion.  The patient has required 4 doses of 4 mg morphine, 6 doses yesterday, and 4 doses of 2 mg Ativan, 6 doses yesterday, thus far today.  Medications will be continued and adjusted as needed for symptom management for comfort.  Continued decline evident.  I answered all of the patient's daughters questions.    Plan for disposition:  GURVINDER Hammer MD  Hospice and Palliative Medicine  03/12/21  16:35 EST

## 2021-03-12 NOTE — PROGRESS NOTES
[facility] Visit Report    Manju Laughlin  [id number]  3/12/2021    Admission R/T [facility] Dx: yes    Reason for [facility] Admission:Complex status epilepticus    Symptom  Management: pain, seizures, restlessness and congestion    Nursing/Medication Recommendations:nothing at this time    Psychosocial Issues and Recommendations:    Spiritual Concerns and Recommendations:    [facility] Discharge Plans:  Nothing at this time, patient is actively dying and appears imminent    Review of Visit (Include All Collaboration- including names of hospital and family involved during admission/visit):RN arrived on the unit and received report from ABELARDO Elias. RN also reviewed Epic notes. RN arrived at bedside. Multiple family members are at bedside. Patient is laying in a recovery position on her left side. Patient is unresponsive during the visit. PPS is at 10%. Audible congestion remains present. Cheyne-Ruiz breathing is present. Upper accessory muscle usage is present with each respiration. Color is ashen. Nailbeds are cyanotic. Mottling is present to bilateral feet and hands. Extremities are warm today. Urine is farzad with minimal output. Patient is actively dying along with appearing imminent and is showing no signs of distress or discomfort during the visit. RN reviewed patient's current and declining condition. Family verbalizes understanding of patient's condition. They have no questions, concerns or issues at this time.        Claude Segura RN

## 2021-03-12 NOTE — PLAN OF CARE
Goal Outcome Evaluation:  Plan of Care Reviewed With: family  Progress: declining  Outcome Summary: Patient nonresponsive with long periods of apnea.  Premedicated for activity with morphine, ativan and robinul IV.  Family at bedside in full agreement with comfort POC and wish to d/c keppra.

## 2021-03-12 NOTE — PROGRESS NOTES
Palliative Care/Hospice Follow Up Note       LOS: 6 days   Patient Care Team:  Etienne Grajeda MD as PCP - General (Family Medicine)  Joselito Hammer MD as Attending Provider (Hospice and Palliative Medicine)    Chief Complaint:  Complex seizures    Interval History:     Patient Complaints: None  Patient Denies:  None  History taken from: I reviewed with the patient's daughter; I reviewed with the RN; hospice RN note from today reviewed    Review of Systems:  As above.    Palliative Performance Scale  Palliative Performance Scale Score: 10%  [place] Symptom Assessment System Revised  Pain Score: no pain   ESAS Tiredness Score: Worst possible tiredness  ESAS Nausea Score: No nausea  ESAS Depression Score: unable to assess  ESAS Anxiety Score: No anxiety  ESAS Drowsiness Score: Worst possible drowsiness  ESAS Lack of Appetite Score: Worst lack of appetite  ESAS Wellbeing Score: 7  ESAS Dyspnea Score: 6  ESAS Other Problem Score: 5 (congestion)  ESAS Source of Information: healthcare professional caregiver  ESAS Intervention: medicated/see MAR  ESAS Intervention Response: tolerated    Vital Signs  Temp:  [99.5 °F (37.5 °C)-101.9 °F (38.8 °C)] 101.9 °F (38.8 °C)  Heart Rate:  [] 111  Resp:  [8] 8  BP: ()/(61-68) 86/61  Device (Oxygen Therapy): room air SpO2:  [70 %-75 %] 70 %    Physical Exam:  General Appearance:    Not awake and in no acute distress lying on her left side, chronically ill-appearing elderly female   Throat:   No oral lesions, oral mucosa somewhat moist   Neck:   No adenopathy, supple, trachea midline   Lungs:     Clear to auscultation with minimal scattered rhonchi, respirations diminished with low volumes and not labored, occasional pause noted    Heart:    Regular rhythm and tachycardia    Abdomen:     Occasional bowel sounds, soft and non-tender, non-distended   Extremities:   No edema, legs and feet with some mottling, pale/ashen and cyanosis evident   Pulses:   Radial  pulses palpable and equal bilaterally          Results Review:     I reviewed the patient's new clinical results.    Medication Reviewed.    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 I reviewed with the RN. With medications being administered, the patient appears comfortable.  The patient continues to receive IV Keppra every 12 hours.  The patient has received glycopyrrolate for airway congestion.  The patient has required 4 doses of 4 mg morphine, 6 doses yesterday, and 3 doses of 1 mg and 1 dose of 2 mg Ativan, 6 doses yesterday, thus far today.  Medications will be continued and adjusted as needed for symptom management for comfort.  Slow decline evident.  I answered all of the patient's daughters questions.    Plan for disposition:  GURVINDER Hammer MD  Hospice and Palliative Medicine  03/11/21  19:36 EST

## 2021-03-13 NOTE — PLAN OF CARE
Goal Outcome Evaluation:  Plan of Care Reviewed With: patient, family  Progress: declining  Outcome Summary: Pt rested well. IV team consulted for midline (d/t frequent loss of IV's) and placed accu cath 2.25 in the left upper arm. Medicated for comfort prior to Q4 turns. Continue comfort care.

## 2021-03-13 NOTE — CONSULTS
Iv team consulted to place a midline due to pt has had multiple failed piv's. Assessed TOO with U/S pt had no compressible veins, extremity is swollen with pitting edema. Carlos ZHOU asst me in turning pt to back. I evaluated the CHRIS with U/S, veins were small with a lot of bifurcations noted not suitable for a midline. Placed a 20G accu cath 2.25 in the cephalic proximal CHRIS, just shy of the shoulder. Pt's family remained at BSD.

## 2021-03-13 NOTE — PROGRESS NOTES
[facility] Visit Report    Manju Laughlin  [id number]  3/13/2021    Admission R/T [facility] Dx: Yes    Reason for [facility] Admission: Complex status epilepticus    Symptom  Management: Pain control, anxiety, dyspnea, seizures    Nursing/Medication Recommendations: Call [facility] with any questions/concerns or with TOD at [phone]    Psychosocial Issues and Recommendations:    Spiritual Concerns and Recommendations:    [facility] Discharge Plans:  Patient continues to be in the active phases of dying. She continues to require IV medications prior to turns to help maintain her current level of comfort. Will continue to monitor closely    Review of Visit: Reviewed v/s, medications and notes in Epic. This RN received an update from [facility] RN, Татьяна; no issues/concerns reported. Upon arrival to room, daughter, Jessica, is present; I greeted her. She reports changes in breathing since yesterday and changes in skin color. Patient is lying in right recovery position with her eyes closed. Respirations are shallow with long periods of apnea noted. Hands are noted with mottling and nail beds are cyanotic, however skin is warm to the touch. Patient does not awaken to voice or touch. Observed RR is 7/min with 30 second periods of apnea noted. Mild audible congestion heard. Heart tones are tachycardiac. Abdomen is soft with an occasional bowel sounds noted. Pitting edema noted to both feet with mottling. Feet are mildly cool to the touch. F/c in place with farzad u/o. Patient continues with a PPS of 10% and is actively dying, d/w Jessica, she does v/u. Patient does appear to be comfortable and peaceful at this time. She is receiving IV medications prior to turns to help maintain her current level of comfort. Since midnight she has received Ativan 2mg IV PRN x4 doses, Morphine 4mg IV PRN x4 doses, Robinul 0.4mg IV PRN x4 doses, and a Scopalamine patch is in  place. Will continue to visit daily, assess needs of patient/family and provide support        Jesica Clayton RN  [facility] Visit Nurse--Scattered bed team

## 2021-03-13 NOTE — PROGRESS NOTES
Palliative Care/Hospice Follow Up Note       LOS: 8 days   Patient Care Team:  Etienne Grajeda MD as PCP - General (Family Medicine)  Joselito Hammer MD as Attending Provider (Hospice and Palliative Medicine)    Chief Complaint:  Complex seizures    Interval History: Secretions noted but not having respiratory distress. Cephalic LUE IV placed by IV team last night after she lost multiple peripherals.    Patient Complaints: None  Patient Denies:  None  History taken from: Discussed with daughter at bedside. Reviewed nursing and hospice notes.    Review of Systems:  As above.    Palliative Performance Scale  Palliative Performance Scale Score: 10%  [place] Symptom Assessment System Revised  Pain Score: no pain   ESAS Tiredness Score: Worst possible tiredness  ESAS Nausea Score: No nausea  ESAS Depression Score: No depression  ESAS Anxiety Score: No anxiety  ESAS Drowsiness Score: Worst possible drowsiness  ESAS Lack of Appetite Score: Worst lack of appetite  ESAS Wellbeing Score: Best wellbeing  ESAS Dyspnea Score: 6  ESAS Other Problem Score: unable to assess  ESAS Source of Information: patient, family caregiver, healthcare professional caregiver  ESAS Intervention: medicated/see MAR  ESAS Intervention Response: tolerated    Vital Signs  Temp:  [97.5 °F (36.4 °C)-98.8 °F (37.1 °C)] 97.5 °F (36.4 °C)  Heart Rate:  [102-113] 102  Resp:  [10] 10  BP: (79)/(60) 79/60  Device (Oxygen Therapy): room air SpO2:  [72 %-82 %] 72 %    Physical Exam:  General Appearance:    Not awake and in no acute distress lying on her left side, chronically ill-appearing elderly female     Throat:   No oral lesions, oral mucosa somewhat moist     Neck:   No adenopathy, supple, trachea midline   Lungs:     Bilateral rales and rhonchi. No wheezes. Decreased breath sounds.    Heart:    Regular rhythm and regular rate    Abdomen:     soft and non-tender, non-distended     Extremities:   No edema, legs and feet with some mottling,  pale/ashen and cyanosis evident     Pulses:   Radial pulses palpable and equal bilaterally          Results Review:     I reviewed the patient's new clinical results.    Medication Reviewed.    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 Scopolamine patch in place for secretions and robinul has been needed frequently. Requiring IV morphine. Keppra was stopped due to IV issues. No overt seizure activity currently with needed IV ativan. Prognosis hours to days.    Plan for disposition:  B    Gaurav Dinh MD  Hospice and Palliative Medicine  03/13/21  09:24 EST

## 2021-03-13 NOTE — PLAN OF CARE
Goal Outcome Evaluation:     Progress: declining  Outcome Summary: Pt premedicated prior to turns and appears to be resting comfortable. Family at bedside and aware pt is declining, will continue to monitor
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
## 2021-03-14 NOTE — DISCHARGE SUMMARY
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 female who has a past medical history of dementia, anxiety, hypertension, debility, and epilepsy who presented to the hospital 3/3/2021 with left-sided weakness, tremors, and metabolic encephalopathy who was found to be in complex partial status epilepticus.     She received treatment with the assistance of neurologic consult team.  She received multiple antiepileptic drugs and Ativan for breakthrough and despite this aggressive treatment she did not have improvement in her seizures.  Case was discussed with neurology consult team at length and all felt prognosis was poor.  Discussions concerning poor prognosis communicated to the daughter who decided on comfort measures only.     Daughter was counseled and confirmed CODE STATUS with comfort measures only and plans for inpatient hospice.  Hospice evaluated the patient and all agreed to discharge her from acute care and readmit her as a hospice scattered bed patient.  Medications were provided for symptom management for comfort. I discussed with the patient's daughter almost daily.  Gradual decline noted.  Earlier this a.m., I was called that the patient's respirations ceased and no pulse palpable. No heart sounds audible. I pronounced the patient at 0112 hours.      Joselito Hammer MD  Hospice and Palliative Medicine  21  09:25 EDT

## 2021-03-14 NOTE — PROGRESS NOTES
Case Management Discharge Note      Final Note:  3/14         Selected Continued Care - Discharged on 3/14/2021 Admission date: 3/5/2021 - Discharge disposition:     Destination Coordination complete    Service Provider Selected Services Address Phone Fax Patient Preferred    [facility]  Inpatient Hospice [address], [facility] [id number] [phone] --                Selected Continued Care - Prior Encounters Includes selections from prior encounters from 2020 to 3/14/2021    Discharged on 3/5/2021 Admission date: 3/3/2021 - Discharge disposition: Hospice/Medical Facility (DC - External)    Destination     Service Provider Selected Services Address Phone Fax Patient Preferred    [facility]  Inpatient Hospice [address], [facility] [address] [phone] --                         Final Discharge Disposition Code: 20 -

## 2021-03-15 NOTE — PROGRESS NOTES
Case Management Discharge Note      Final Note: The patient  on 3/14/21 @ 01:10. B. Colleen Rn, CCP.         Selected Continued Care - Discharged on 3/14/2021 Admission date: 3/5/2021 - Discharge disposition:     Destination Coordination complete    Service Provider Selected Services Address Phone Fax Patient Preferred    [facility]  Inpatient Hospice [address], [place] [id number] [phone] --          Durable Medical Equipment    No services have been selected for the patient.              Dialysis/Infusion    No services have been selected for the patient.              Home Medical Care    No services have been selected for the patient.              Therapy    No services have been selected for the patient.              Community Resources    No services have been selected for the patient.                Selected Continued Care - Prior Encounters Includes selections from prior encounters from 2020 to 3/14/2021    Discharged on 3/5/2021 Admission date: 3/3/2021 - Discharge disposition: Hospice/Medical Facility (DC - External)    Destination     Service Provider Selected Services Address Phone Fax Patient Preferred    [facility]  Inpatient Hospice [address], [place] [id number] [phone] --                         Final Discharge Disposition Code: 41 -  in medical facility

## 2021-05-10 NOTE — H&P
"   History and Physical      Patient Name: Manju Laughlin   Patient ID: [id number]   Sex: Female   YOB: 1928    Referring Provider: Olman Alvarez MD    Visit Date: August 18, 2020    Provider: MADIHA Jiang   Location: [facility]   Location Address: [address] [place] [address]  [id number]   Location Phone: [phone]          Chief Complaint     tremor       History Of Present Illness  Manju Laughlin is a 89 year old /White female who presents today to [facility] today referred from Etienne Grajeda MD. Patient presents with daughter. Daughter is under the understanding she is having seizure like spells. States that spells began in January. She had a suspected TIA where she \"couldn't move\" but did not seek medical attention. Was admitted to Hospice in Feb for pneumonia but has since been discharged from Hospice care. Has been seeing PCP for these spells since January. He started Keppra 250mg last week. Daughter feels the severity of the spells has decreased. Having spells where she slumps over and becomes unresponsive. Denies tongue biting or urinary incontinence. Daughter states she will have spells that occur for about 1.5 hours three times daily. In that 1.5 hour timespan she has multiple spells where she will be unresponsive and then more responsive. Has fallen forward out of her wheelchair before during a spell. 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 Daughter states that she feels sometimes she has rigidity.      While in the office for about 1 hour, the patient had multiple \"spells\" similar to her usual \"seizures\".  Has been slumped over in her wheelchair several times during the visit.  She does awake at times and is A&Ox3 during those times.       Past Medical History  Arthritis; Carcinoma In Situ Of Skin; Chest Pain; chronic back pain; Cystocele, midline; High cholesterol; Hypertension; " Migraine; Neuropathy; Tremors; Urinary Tract Infection         Past Surgical History  Adenoidectomy; Appendectomy; Cataract surgery; Hemorrhoidectomy; Hysterectomy; Skin cancer removed.; Stripping of Varicose Veins; Tonsilectomy         Medication List  Allegra Allergy 180 mg oral tablet; aspirin 81 mg oral tablet,delayed release (DR/EC); Keppra 250 mg oral tablet; lorazepam 0.5 mg oral tablet; Miralax oral; nabumetone 500 mg oral tablet; nadolol 40 mg oral tablet; Norvasc 5 mg oral tablet; Seroquel 25 mg oral tablet; Zocor 40 mg oral tablet; Zoloft 25 mg oral tablet         Allergy List  Codeine Phosphate; Codeine Sulfate; SULFA (SULFONAMIDES)       Allergies Reconciled  Family Medical History  Colon Neoplasm, Sigmoid, Malignant; Brain Neoplasm, Malignant         Social History  Tobacco (Never)         Review of Systems  · Constitutional  o Admits  o : chills  o Denies  o : excessive sweating, fatigue, fever, sycope/passing out, weight gain, weight loss  · Eyes  o Denies  o : changes in vision, blurry vision, double vision  · HENT  o Admits  o : ringing in the ears, sore throat  o Denies  o : loss of hearing, ear aches, nasal congestion, sinus pain, nose bleeds, seasonal allergies  · Cardiovascular  o Admits  o : swollen legs, easy burising or bleeding  o Denies  o : blood clots, anemia, transfusions  · Respiratory  o Admits  o : dry cough  o Denies  o : shortness of breath, productive cough, pneumonia, COPD  · Gastrointestinal  o Denies  o : difficulty swallowing, reflux  · Genitourinary  o Admits  o : incontinence  · Neurologic  o Admits  o : headache, seizure, tremor, loss of balance, falls, numbness/tingling/paresthesia   o Denies  o : stroke, dizziness/vertigo, difficulty with sleep, difficulty with coordination, difficulty with dexterity, weakness  · Musculoskeletal  o Admits  o : neck stiffness/pain, muscle aches, joint pain, weakness, spasms, pain radiating in leg, low back pain  o Denies  o : swollen lymph  "nodes, sciatica, pain radiating in arm  · Endocrine  o Denies  o : diabetes, thyroid disorder  · Psychiatric  o Admits  o : anxiety, depression      Vitals  Date Time BP Position Site L\R Cuff Size HR RR TEMP (F) WT  HT  BMI kg/m2 BSA m2 O2 Sat HC       08/18/2020 10:10 /58 Sitting    88 - R  97.3  5'  5\"             Physical Examination  · Constitutional  o Appearance  o : well-nourished, well groomed, in no apparent distress  · Eyes  o Pupils and Irises  o : Pupils equal, round, and reactive to light and accommodation bilaterally  · Respiratory  o Auscultation of Lungs  o : Lungs were clear to ascultation bilaterally. No wheezes, rhonchi or rales were appreciated.  · Cardiovascular  o Heart  o :   § Auscultation of Heart  § : Regular rate and rhythm, no murmurs, gallops or rubs were appreciated.  o Peripheral Vascular System  o :   § Extremities  § : No peripheral edema was appreciated  · Musculoskeletal  o General  o : Normal bulk and normal tone throughout  · Neurologic  o Mental Status Examination  o :   § Orientation  § : Alert and oriented to person, place, and time,  o Cranial Nerves  o : Pupils are equal, round and reactive to light. Extraocular movements are intact. Visual fields are full. Fundoscopic examination reveals sharp disc bilaterally. Sensation in the V1-V3 distribution is intact and symmetric. Muscles of mastication are strong and symmetric. Muscles of facial expression are strong and symmetric. Hearing is intact. Palatal raise is intact and symmetric. Uvula is midline. Shoulder shrug is strong. Tongue protrudes in the midline.  o Motor Examination  o :   § RUE Strength  § : strength normal  § LUE Strength  § : strength normal  § RLE Strength  § : strength normal  § LLE Strength  § : strength normal  o Reflexes  o : 2+ reflexes throughout and symmetric. Negative Townsend. Negative Babinski.   o Sensation  o : Intact sensation to light touch, pinprick, vibration and proprioception " throughout.  o Gait and Station  o :   § Gait Screening  § : wheelchair-dependent           Assessment  · Spell of loss of consciousness     780.09/R55  She had multiple episodes of change in level of consciousness and was slumped over in the wheelchair requiring someone to hold her shoulders back to prevent her from falling forward while in the office today. Vitals were stable. Breathing was regular and unlabored. Decision was made to consult hospitalist for direct admit for seizure vs syncope. Hospitalist advised to send to ER for imaging first. EMS called and patient transferred from our office to [facility] ER. Was awake and responsive upon leaving the clinic with EMS. Vital signs were stable. Discussed with the daughter that we can follow-up with her outpatient after her admission if needed. Discussed that I am concerned about the amount of Ativan she is receiving at the nursing facility.     Problems Reconciled  Plan  · Medications  o Medications have been Reconciled  o Transition of Care or Provider Policy  · Instructions  o Encouraged to follow-up with Primary Care Provider for preventative care.  o Call or Return if symptoms worsen or persist.   o Follow Up PRN.  o Greater than 1 hour spent face to face with the patient with greater than 50% of that time used for coordination of care.             Electronically Signed by: MADIHA Jiang -Author on August 18, 2020 12:19:23 PM

## 2021-05-13 NOTE — PROGRESS NOTES
"   Progress Note      Patient Name: Manju Laughlin   Patient ID: [id number]   Sex: Female   YOB: 1928    Primary Care Provider: Etienne Grajeda MD   Referring Provider: Olman Alvarez MD    Visit Date: September 22, 2020    Provider: MADIHA Jiang   [facility] [place]   Location Address: [address] [place] [address] [address] [place], [place]  [id number]   Location Phone: [phone]          Chief Complaint  · Follow Up Exam     Patient is here with complaints of spells where she dazes off, she can hear and see but cant speak or acknowledge others.       History Of Present Illness  Manuj Laughlin is a 89 year old /White female who presents today to [facility] today referred from Etienne Grajeda MD. Patient presents with daughter. Daughter is under the understanding she is having seizure like spells. States that spells began in January. She had a suspected TIA where she \"couldn't move\" but did not seek medical attention. Was admitted to Hospice in Feb for pneumonia but has since been discharged from Hospice care. Has been seeing PCP for these spells since January. He started Keppra 250mg last week. Daughter feels the severity of the spells has decreased. Having spells where she slumps over and becomes unresponsive. Denies tongue biting or urinary incontinence. Daughter states she will have spells that occur for about 1.5 hours three times daily. In that 1.5 hour timespan she has multiple spells where she will be unresponsive and then more responsive. Has fallen forward out of her wheelchair before during a spell. 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 Daughter states that she feels sometimes she has rigidity. While in the office for about 1 hour, the patient had multiple \"spells\" similar to her usual \"seizures\". Has been slumped over in her wheelchair several times during the visit. She does awake at " "times and is A&Ox3 during those times.   Manju Laughlin is a 89 year old /White female who presents today to [facility] today for a follow up exam. Spells are some decreased since hospitalization. States she feels the spell is going to happen, hurries up and gets to her chair and then will \"go out\". Remains on PRN lorazepam, up to 0.5mg q6 hrs. Reviewed inpatient records, Sergio's note, PCP note. She is currently on Keppra but is still experiencing spells. Dr. Hill felt she was experiencing early dementia or spells of psychogenic origin.       Past Medical History  Arthritis; Carcinoma In Situ Of Skin; Chest Pain; chronic back pain; Cystocele, midline; High cholesterol; Hypertension; Migraine; Neuropathy; Tremors; Urinary Tract Infection         Past Surgical History  Adenoidectomy; Appendectomy; Cataract surgery; Hemorrhoidectomy; Hysterectomy; Skin cancer removed.; Stripping of Varicose Veins; Tonsilectomy         Medication List  Allegra Allergy 180 mg oral tablet; amlodipine 5 mg oral tablet; aspirin 81 mg oral tablet,delayed release (DR/EC); Keppra 250 mg oral tablet; lorazepam 0.5 mg oral tablet; Miralax oral; mirtazapine 30 mg oral tablet; nabumetone 500 mg oral tablet; nadolol 40 mg oral tablet; Norvasc 5 mg oral tablet; quetiapine 25 mg oral tablet; Vitamin D3 25 mcg (1,000 unit) oral capsule; Zoloft 25 mg oral tablet         Allergy List  Codeine Phosphate; Codeine Sulfate; SULFA (SULFONAMIDES)         Family Medical History  Colon Neoplasm, Sigmoid, Malignant; Brain Neoplasm, Malignant         Social History  Tobacco (Never)         Review of Systems  · Constitutional  o Admits  o : chills  o Denies  o : excessive sweating, fatigue, fever, sycope/passing out, weight gain, weight loss  · Eyes  o Denies  o : changes in vision, blurry vision, double vision  · HENT  o Admits  o : ringing in the ears, sore throat  o Denies  o : loss of hearing, ear aches, nasal congestion, sinus pain, nose " "bleeds, seasonal allergies  · Cardiovascular  o Admits  o : swollen legs, easy burising or bleeding  o Denies  o : blood clots, anemia, transfusions  · Respiratory  o Admits  o : dry cough  o Denies  o : shortness of breath, productive cough, pneumonia, COPD  · Gastrointestinal  o Denies  o : difficulty swallowing, reflux  · Genitourinary  o Admits  o : incontinence  · Neurologic  o Admits  o : headache, seizure, tremor, loss of balance, falls, numbness/tingling/paresthesia   o Denies  o : stroke, dizziness/vertigo, difficulty with sleep, difficulty with coordination, difficulty with dexterity, weakness  · Musculoskeletal  o Admits  o : neck stiffness/pain, muscle aches, joint pain, weakness, spasms, pain radiating in leg, low back pain  o Denies  o : swollen lymph nodes, sciatica, pain radiating in arm  · Endocrine  o Denies  o : diabetes, thyroid disorder  · Psychiatric  o Admits  o : anxiety, depression      Vitals  Date Time BP Position Site L\R Cuff Size HR RR TEMP (F) WT  HT  BMI kg/m2 BSA m2 O2 Sat        09/22/2020 02:19 /78 Sitting    74 - R  97.6 147lbs 16oz 5'  5\" 24.63 1.75           Physical Examination  · Constitutional  o Appearance  o : well-nourished, well groomed, in no apparent distress  · Eyes  o Pupils and Irises  o : Pupils equal, round, and reactive to light and accommodation bilaterally  · Respiratory  o Auscultation of Lungs  o : Lungs were clear to ascultation bilaterally. No wheezes, rhonchi or rales were appreciated.  · Cardiovascular  o Heart  o :   § Auscultation of Heart  § : Regular rate and rhythm, no murmurs, gallops or rubs were appreciated.  o Peripheral Vascular System  o :   § Extremities  § : No peripheral edema was appreciated  · Musculoskeletal  o General  o : Normal bulk and normal tone throughout  · Neurologic  o Mental Status Examination  o :   § Orientation  § : Alert and oriented to person, place, and time,  o Cranial Nerves  o : Pupils are equal, round and " reactive to light. Extraocular movements are intact. Visual fields are full. Fundoscopic examination reveals sharp disc bilaterally. Sensation in the V1-V3 distribution is intact and symmetric. Muscles of mastication are strong and symmetric. Muscles of facial expression are strong and symmetric. Hearing is intact. Palatal raise is intact and symmetric. Uvula is midline. Shoulder shrug is strong. Tongue protrudes in the midline.  o Motor Examination  o :   § RUE Strength  § : strength normal  § LUE Strength  § : strength normal  § RLE Strength  § : strength normal  § LLE Strength  § : strength normal  o Reflexes  o : 2+ reflexes throughout and symmetric. Negative Townsend. Negative Babinski.   o Sensation  o : Intact sensation to light touch, pinprick, vibration and proprioception throughout.  o Gait and Station  o :   § Gait Screening  § : wheelchair-dependent   o Cerebellar Function  o : intact finger to nose and heel to shin. Rapid alternating movements are intact in the upper and lower extremities.           Assessment  · Spell of loss of consciousness     780.09/R55  Strongly encourage PCP to taper benzo. I am not convinced these spells are epileptic. EEG normal. MRI brain normal. Will continue Keppra for now. Agree with Dr. Hill that origin of spells could be early signs of dementia, benzo overuse, or psychogenic.     Problems Reconciled  Plan  · Medications  o Keppra 250 mg oral tablet   SIG: take 1 tablet by oral route 2 times a day for 30 days   DISP: (60) tablet with 2 refills  Prescribed on 09/24/2020     o Medications have been Reconciled  o Transition of Care or Provider Policy  · Instructions  o Call or Return if symptoms worsen or persist.   o Follow up in 3 months.            Electronically Signed by: MADIHA Jiang -Author on September 24, 2020 04:32:16 PM

## 2021-05-14 VITALS
BODY MASS INDEX: 24.99 KG/M2 | HEIGHT: 65 IN | TEMPERATURE: 97.8 F | WEIGHT: 150 LBS | DIASTOLIC BLOOD PRESSURE: 86 MMHG | SYSTOLIC BLOOD PRESSURE: 120 MMHG | HEART RATE: 77 BPM

## 2021-05-14 VITALS
DIASTOLIC BLOOD PRESSURE: 78 MMHG | SYSTOLIC BLOOD PRESSURE: 145 MMHG | HEART RATE: 74 BPM | TEMPERATURE: 97.6 F | BODY MASS INDEX: 24.66 KG/M2 | HEIGHT: 65 IN | WEIGHT: 148 LBS

## 2021-05-14 NOTE — PROGRESS NOTES
"   Progress Note      Patient Name: Manju Laughlin   Patient ID: [id number]   Sex: Female   YOB: 1928    Primary Care Provider: Etienne Grajeda MD   Referring Provider: Olman Alvarez MD    Visit Date: February 22, 2021    Provider: MADIHA Jiang   Location: [facility]   Location Address: [address] [place] [address]  [id number]   Location Phone: [phone]          Chief Complaint     F/u after [facility] admission       History Of Present Illness  Manju Laughlin is a 89 year old /White female who presents today to [facility] today referred from Etienne Grajeda MD. Patient presents with daughter. Daughter is under the understanding she is having seizure like spells. States that spells began in January. She had a suspected TIA where she \"couldn't move\" but did not seek medical attention. Was admitted to Hospice in Feb for pneumonia but has since been discharged from Hospice care. Has been seeing PCP for these spells since January. He started Keppra 250mg last week. Daughter feels the severity of the spells has decreased. Having spells where she slumps over and becomes unresponsive. Denies tongue biting or urinary incontinence. Daughter states she will have spells that occur for about 1.5 hours three times daily. In that 1.5 hour timespan she has multiple spells where she will be unresponsive and then more responsive. Has fallen forward out of her wheelchair before during a spell. 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 Daughter states that she feels sometimes she has rigidity. While in the office for about 1 hour, the patient had multiple \"spells\" similar to her usual \"seizures\". Has been slumped over in her wheelchair several times during the visit. She does awake at times and is A&Ox3 during those times.   Manju Laughlin is a 89 year old /White female who presents today to " "[facility] today for a follow up exam. Was seen inpatient at [facility]. They felt that she was suffering from pseudoseizure. Was placed on abilify and fell 3 nights later and was admitted to [facility] for 3 days. They d/c Remeron at ER. Had been spell free until last Friday. Started shaking her head back and forth then \"dropped\". Had about 5 episodes within 35 minutes and then an hour later 2 more episodes. Two days later she had another spell.       Past Medical History  Arthritis; Carcinoma In Situ Of Skin; Chest Pain; chronic back pain; Cystocele, midline; High cholesterol; Hypertension; Migraine; Neuropathy; Tremors; Urinary Tract Infection         Past Surgical History  Adenoidectomy; Appendectomy; Cataract surgery; Hemorrhoidectomy; Hysterectomy; Skin cancer removed.; Stripping of Varicose Veins; Tonsilectomy         Medication List  Allegra Allergy 180 mg oral tablet; aspirin 81 mg oral tablet,delayed release (DR/EC); divalproex 250 mg oral tablet extended release 24 hr; Keppra 250 mg oral tablet; Miralax oral; nabumetone 500 mg oral tablet; nadolol 40 mg oral tablet; Norvasc 5 mg oral tablet; Vitamin D3 25 mcg (1,000 unit) oral capsule; Zoloft 25 mg oral tablet         Allergy List  Codeine Phosphate; Codeine Sulfate; SULFA (SULFONAMIDES)       Allergies Reconciled  Family Medical History  Colon Neoplasm, Sigmoid, Malignant; Brain Neoplasm, Malignant         Social History  Tobacco (Never)         Review of Systems  · Constitutional  o Admits  o : chills  o Denies  o : excessive sweating, fatigue, fever, sycope/passing out, weight gain, weight loss  · Eyes  o Denies  o : changes in vision, blurry vision, double vision  · HENT  o Admits  o : ringing in the ears, sore throat  o Denies  o : loss of hearing, ear aches, nasal congestion, sinus pain, nose bleeds, seasonal allergies  · Cardiovascular  o Admits  o : swollen legs, easy burising or bleeding  o Denies  o : blood clots, anemia, " "transfusions  · Respiratory  o Admits  o : dry cough  o Denies  o : shortness of breath, productive cough, pneumonia, COPD  · Gastrointestinal  o Denies  o : difficulty swallowing, reflux  · Genitourinary  o Admits  o : incontinence  · Neurologic  o Admits  o : headache, seizure, tremor, loss of balance, falls, numbness/tingling/paresthesia   o Denies  o : stroke, dizziness/vertigo, difficulty with sleep, difficulty with coordination, difficulty with dexterity, weakness  · Musculoskeletal  o Admits  o : neck stiffness/pain, muscle aches, joint pain, weakness, spasms, pain radiating in leg, low back pain  o Denies  o : swollen lymph nodes, sciatica, pain radiating in arm  · Endocrine  o Denies  o : diabetes, thyroid disorder  · Psychiatric  o Admits  o : anxiety, depression      Vitals  Date Time BP Position Site L\R Cuff Size HR RR TEMP (F) WT  HT  BMI kg/m2 BSA m2 O2 Sat FR L/min FiO2        01/27/2021 02:59 /86 Sitting    77 - R  97.8 150lbs 0oz 5'  5\" 24.96 1.77             Physical Examination  · Constitutional  o Appearance  o : well-nourished, well groomed, in no apparent distress  · Eyes  o Pupils and Irises  o : Pupils equal, round, and reactive to light and accommodation bilaterally  · Respiratory  o Auscultation of Lungs  o : Lungs were clear to ascultation bilaterally. No wheezes, rhonchi or rales were appreciated.  · Cardiovascular  o Heart  o :   § Auscultation of Heart  § : Regular rate and rhythm, no murmurs, gallops or rubs were appreciated.  o Peripheral Vascular System  o :   § Extremities  § : No peripheral edema was appreciated  · Musculoskeletal  o General  o : Normal bulk and normal tone throughout  · Neurologic  o Mental Status Examination  o :   § Orientation  § : Alert and oriented to person, place, and time,  o Cranial Nerves  o : Pupils are equal, round and reactive to light. Extraocular movements are intact. Visual fields are full. Fundoscopic examination reveals sharp disc " bilaterally. Sensation in the V1-V3 distribution is intact and symmetric. Muscles of mastication are strong and symmetric. Muscles of facial expression are strong and symmetric. Hearing is intact. Palatal raise is intact and symmetric. Uvula is midline. Shoulder shrug is strong. Tongue protrudes in the midline.  o Motor Examination  o :   § RUE Strength  § : strength normal  § LUE Strength  § : strength normal  § RLE Strength  § : strength normal  § LLE Strength  § : strength normal  o Reflexes  o : 2+ reflexes throughout and symmetric. Negative Townsend. Negative Babinski.   o Sensation  o : Intact sensation to light touch, pinprick, vibration and proprioception throughout.  o Gait and Station  o :   § Gait Screening  § : wheelchair-dependent   o Cerebellar Function  o : intact finger to nose and heel to shin. Rapid alternating movements are intact in the upper and lower extremities.           Assessment  · Dementia with behavioral disturbance, unspecified dementia type     294.21/F03.91  Will start aricept  · Psychogenic nonepileptic seizure     300.11/F44.5  Will refer to Psychiatry for other medication suggestions due to failure of abilify and remeron      Plan  · Orders  o PSYCHIATRY CONSULTATION (PSYCH) - 294.21/F03.91, 300.11/F44.5 - 02/22/2021  · Medications  o donepezil 10 mg oral tablet   SIG: take 1/2 tablet (5 mg) by oral route once daily in the evening for 2 weeks, then increase to 1 tablet (10mg) qHS.   DISP: (30) Tablet with 2 refills  Prescribed on 02/22/2021     o Medications have been Reconciled  o Transition of Care or Provider Policy            Electronically Signed by: MADIHA Jiang -Author on February 25, 2021 10:38:16 AM

## 2021-05-15 VITALS
SYSTOLIC BLOOD PRESSURE: 141 MMHG | HEART RATE: 88 BPM | HEIGHT: 65 IN | DIASTOLIC BLOOD PRESSURE: 58 MMHG | TEMPERATURE: 97.3 F

## 2021-05-18 NOTE — PROGRESS NOTES
Angelica Brown  08/02/1928     Office/Outpatient Visit    Visit Date: Wed, Feb 17, 2021 04:14 pm    Provider: Etienne Grajeda MD (Assistant: Phyllis Ruiz,  )    Location: [facility]        Electronically signed by Etienne Grajeda MD on  02/17/2021 08:19:08 PM                             Subjective:        CC: Ms. Brown is a 89 year old White female.  She is here today following a transition of care from an inpatient hospital: [facility]. 2/2/21. Our office called the patient within 48 hours of discharge and scheduled the follow-up appointment.. During the patient's hospital stay the patient was treated by Dr. Salvador.  please call [email or web address] cell number [phone] for doximity video.holding remeron until see neurologist on 1/22/21Today's encounter is being done with a telehealth visit. She has consented verbally with two witnesses for todays treatment. Todays visit is being conducted by audio and video. Individuals present during the telemedicine consultation include ANGELICA BROWN, NGOC BRANNON (DAUGHTER), ETIENNE GRAJEDA MD         HPI:           Ms. Brown presents in follow up from hospital admission She was diagnosed with LEG WEAKNESS.  The following radiology tests were done: head CT, BACK X-RAYS.  The patient's course has improved.  It is of moderate intensity.  NEG W/U, IMPROVED WITH STEROIDS.  STATES SHE IS GETTING AROUND PRETTY WELL NOW           Complaint of tremor, unspecified..  The symptom began several months ago.  The severity is of moderate intensity.  She estimates that the frequency of this symptom is several times a month.  The typical duration of an episode is 30 to 60 minutes.  OVERALL DOING BETTER.  NEG MRI, EEG AND NEURO EVAL.  HAD ANOTHER EPISODE WHILE IN THE HOSPITALL, WILL SEE NEURO AGAIN SOON     ROS:     CONSTITUTIONAL:  Negative for chills and fever.      CARDIOVASCULAR:  Positive for pedal edema ( mild; CHRONIC, WAXES AND WANES, BETTER WITH ELEVATION ).   " Negative for chest pain or palpitations.      RESPIRATORY:  Negative for recent cough and dyspnea.      GASTROINTESTINAL:  Positive for constipation ( MIRALAX HELPS ).   Negative for abdominal pain, nausea or vomiting.      MUSCULOSKELETAL:  Positive for arthralgias and (DIFFUSE) back pain ( chronic; TYLENOL HELPS ).      NEUROLOGICAL:  Positive for \"UNSTEADY\".   Negative for headaches or weakness.      PSYCHIATRIC:  Positive for insomnia; THE REMERON IS ON HOLD.   Negative for depression.          Past Medical History / Family History / Social History:         Last Reviewed on 2021 07:54 PM by Etienne Grajeda    Past Medical History:             PAST MEDICAL HISTORY             GYNECOLOGICAL HISTORY:                 ADVANCE DIRECTIVES: Living will         PREVENTIVE HEALTH MAINTENANCE             BONE DENSITY: was last done  with the following abnormality noted-- Osteoporosis     COLORECTAL CANCER SCREENING: Up to date (colonoscopy q10y; sigmoidoscopy q5y; Cologuard q3y) was last done , Results are in chart; colonoscopy with the following abnormalities noted-- Polyp(s) and Diverticulosis     Hepatitis C Medicare Screening: Not indicated     MAMMOGRAM: no longer indicated     PAP SMEAR: No longer indicated due to age and history         Surgical History:         Appendectomy    Cataract Removal: bilateral;     Tonsillectomy/Adenoidectomy     Hysterectomy: CO MPLETE;;;     Skin Ca Removal-Face;    VEIN STRIPPING RIGHT LEG;;;    HEMORRHOIDECTOMY;;;         Family History:     Father: Cerebrovascular Accident     Mother: Colon Cancer     Maternal Grandfather: Colon Cancer     Maternal Grandmother: Type 2 Diabetes     Positive for Breast Cancer ( mat. aunt ) and Colon Cancer ( pat. uncle ).          Social History:     Occupation:    Retired     Marital Status:          Tobacco/Alcohol/Supplements:     Last Reviewed on 2021 07:54 PM by Etienne Grajeda    Tobacco: She has " never smoked.          Alcohol: Frequency: Socially         Substance Abuse History:     Last Reviewed on 2/17/2021 07:54 PM by Etienne Grajeda    None         Current Problems:     Last Reviewed on 2/17/2021 07:54 PM by Etienne Grajeda    Osteoporosis    Radiculopathy, lumbosacral region    Constipation, unspecified    Cystocele, midline    History of recurrent UTI's    Diverticulosis    Chronic venous insufficiency    Pure hypercholesterolemia, unspecified    Essential (primary) hypertension    Acne rosacea    Insomnia    Hearing difficulties    Vertigo (mild, chronic)    Dry mouth, unspecified    History of colonic polyps    Allergies    Pulsatile tinnitus, bilateral    Dyspnea, unspecified    Tremor, unspecified    Anxiety disorder, unspecified    Polyneuropathy, unspecified    Encounter for follow-up examination after completed treatment for conditions other than malignant neoplasm        Immunizations:     Influenza, high dose seasonal (FLUZONE HIGH-DOSE 6485-8786) 11/20/2019    zzPrevnar-13 3/4/2015    Flulaval 10/5/2010    Influenza A (H1N1), IM (3+ years) Monovalent 1/26/2010    Pneomovax 10/5/2010    Fluzone High-Dose pf (>=65 yr) 10/1/2017    Fluzone High-Dose pf (>=65 yr) 10/23/2018    Influenza, high dose seasonal 12/15/2020        Allergies:     Last Reviewed on 2/17/2021 07:54 PM by Etienne Grajeda    Sulfonamides:      dicyclomine:      tape adhesive:      Codeine:          Current Medications:     Last Reviewed on 2/17/2021 07:54 PM by Etienne Grajeda    Vitamin D3 1,000 unit oral capsule [once daily ]    Remeron 30 mg oral tablet [take 1 tablet (30 mg) by oral route once daily before bedtime]    TylenoL 325 mg oral capsule [2 po Q 8 Hr prn pain or fever]    Depakote 250 mg oral tablet, delayed release (enteric coated) [take 1 tablet (250 mg) by oral route 2 times per day]    Keppra 250 mg oral tablet [take 1 tablet  2 times per day]    sertraline 100 mg oral tablet  [take 1 tablet (100 mg) by oral route once daily]    nadoloL 40 mg oral tablet [TAKE 1 TABLET(S) BY MOUTH DAILY]    amLODIPine 5 mg oral tablet [TAKE 1 TABLET(S) BY MOUTH DAILY]    Miralax 17 gram/dose oral Powder [1 capful (17g) daily, mixed in 4-8oz of liquid daily]    aspirin 81 mg oral tablet, delayed release (enteric coated) [1 tab daily]    Multivitamin/Mineral Supplement  Caplet [Take 1 tablet(s) by mouth daily]        Objective:        Exams:     PHYSICAL EXAM:     GENERAL: vital signs recorded - well developed, well nourished;  no apparent distress;     RESPIRATORY: normal respiratory rate and pattern with no distress;     PSYCHIATRIC:  appropriate affect and demeanor; normal speech pattern; grossly normal memory;         Assessment:         G62.9   Polyneuropathy, unspecified       R25.1   Tremor, unspecified       G47.00   Insomnia, unspecified           ORDERS:         Radiology/Test Orders:       3017F  Colorectal CA screen results documented and reviewed (PV)  (In-House)                      Plan:         Polyneuropathy, unspecified        MEDICATIONS: (no change to current medication regimen)     RECOMMENDATIONS given include: SEE NEUROLOGY AS PLANNED.      FOLLOW-UP: Schedule a follow-up appointment in 4 weeks.      HOSPITAL NOTES FROM 2/5 REVIEWED.  Observe as improved MIPS Colorectal Cancer Screening is up to date and the results are in the chart Telehealth: Verbal consent obtained for visit to occur via televideo conferencing           Orders:       3017F  Colorectal CA screen results documented and reviewed (PV)  (In-House)              Tremor, unspecified        STABLE ON CURRENT MEDS, WILL AWAIT NEUROLOGY EVAL         Insomnia, unspecified        WILL CONTINUE TO HOLD THE REMERON, CONSIDER DIFF MED             Patient Recommendations:        For  Polyneuropathy, unspecified:    Schedule a follow-up visit in 4 weeks.  I also recommend HOSPITAL NOTES FROM 2/5 REVIEWED..          For  Tremor,  unspecified:    I also recommend STABLE ON CURRENT MEDS, WILL AWAIT NEUROLOGY EVAL.          For  Insomnia, unspecified:    I also recommend WILL CONTINUE TO HOLD THE REMERON, CONSIDER DIFF MED.              Charge Capture:         Primary Diagnosis:     G62.9  Polyneuropathy, unspecified           Orders:      [id number]  Transitional care manage service 14 day discharge  (In-House)            3017F  Colorectal CA screen results documented and reviewed (PV)  (In-House)              R25.1  Tremor, unspecified     G47.00  Insomnia, unspecified

## 2021-05-18 NOTE — PROGRESS NOTES
Manju Laughlin 08/02/1928     Office/Outpatient Visit    Visit Date: Mon, Feb 12, 2018 10:07 am    Provider: Etienne Grajeda MD (Assistant: Marybel Borjas RN)    Location: [facility]        Electronically signed by Etienne Grajeda MD on  02/12/2018 01:27:57 PM                             SUBJECTIVE:        CC:     Ms. Laughlin is a 89 year old White female.  Light headed and off balance r/t fall;         HPI:         Patient presents with high cholesterol.  Current treatment includes a lipid lowering agent.  Compliance with treatment has been good.  Most recent lab tests include Total Cholesterol:  165 (mg/dL) (06/20/2017), HDL:  53 (mg/dL) (06/20/2017), Triglycerides:  240 (mg/dL) (06/20/2017), LDL:  64 (mg/dL) (06/20/2017).          Additionally, she presents with history of hTN.  her current cardiac medication regimen includes a beta-blocker and a calcium channel blocker.  She has not kept a blood pressure diary, but states that pressures have been well controlled.  Compliance with treatment has been good.      ROS:     CONSTITUTIONAL:  Negative for chills and fever.      EYES:  Negative for blurred vision and eye drainage.      E/N/T:  Positive for frequent rhinorrhea ( CHRONIC ).   Negative for ear pain or sore throat.      CARDIOVASCULAR:  Positive for pedal edema ( mild; CHRONIC, WAXES AND WANES, BETTER WITH ELEVATION ).   Negative for chest pain or palpitations.      RESPIRATORY:  Negative for recent cough and dyspnea.      GASTROINTESTINAL:  Positive for constipation ( MIRALAX HELPS ).   Negative for abdominal pain, nausea or vomiting.      GENITOURINARY:  Negative for dysuria and hematuria.      MUSCULOSKELETAL:  Positive for arthralgias and (DIFFUSE) back pain ( chronic; TYLENOL HELPS ).      NEUROLOGICAL:  Positive for dizziness ( CHRONIC, WAXES AND WANES, HAS SEEN ENT FOR THIS BEFORE. ), paresthesia ( bilateral lower extremity; S/P NEUROLOGY EVAL AND EMG. ), weakness ( generalized ) and  "\"UNSTEADY\", HAD A FALL TWO WEEKS AGO.   Negative for headaches.      PSYCHIATRIC:  Positive for insomnia; OTC MELATONIN HELPS BUT HAD SOME DIARRHEA WITH IT..          PMH/FMH/SH:     Last Reviewed on 2018 11:02 AM by Etienne Grajeda    Past Medical History:             PAST MEDICAL HISTORY             GYNECOLOGICAL HISTORY:        Last mammogram was          CURRENT MEDICAL PROVIDERS:    E/N/T    Neurologist             ADVANCE DIRECTIVES: Living will         Surgical History:         Appendectomy    Cataract Removal: bilateral;     Tonsillectomy/Adenoidectomy      Hysterectomy: CO MPLETE;;;     Skin Ca Removal-Face;    VEIN STRIPPING RIGHT LEG;;;    HEMORRHOIDECTOMY;;; Procedures: colonoscopy , 10/05, , ;; Treadmill stress test --NEG;;         Family History:     Father: Cerebrovascular Accident     Mother: Colon Cancer     Maternal Grandfather: Colon Cancer     Maternal Grandmother: Type 2 Diabetes     Positive for Breast Cancer ( mat. aunt ) and Colon Cancer ( pat. uncle ).          Social History: Retired RN     Occupation:    Retired     Marital Status:          Tobacco/Alcohol/Supplements:     Last Reviewed on 2018 11:01 AM by Etienne Grajeda    Tobacco: She has never smoked.          Alcohol: Frequency: Socially             Current Problems:     Last Reviewed on 2018 11:05 AM by Etienne Grajeda    Insomnia     Vertigo (mild, chronic)     Hearing difficulties     Other specified idiopathic peripheral neuropathy     Acne rosacea     Chronic venous insufficiency     Diverticulosis     HTN     High cholesterol     Chronic constipation     History of recurrent UTI's     Cystocele, midline     Chronic low back pain     Osteoporosis     Benign essential tremor     Hip pain     Xerostomia         Immunizations:     Prevnar-13 3/4/2015     Flulaval 10/5/2010     Influenza A (H1N1), IM (3+ years) Monovalent 2010     Pneomovax 10/5/2010     " Fluzone High-Dose pf (>=65 yr) 10/1/2017         Allergies:     Last Reviewed on 2/12/2018 11:01 AM by Etienne Grajeda    Sulfonamides:    Codeine:    tape adhesive:        Current Medications:     Last Reviewed on 2/12/2018 11:03 AM by Etienne Grajeda    Nadolol 40mg Tablet Take 1 tablet(s) by mouth daily     Norvasc 5mg Tablet Take 1 tablet(s) by mouth daily     Zocor 20mg Tablet 1 tab daily     Miralax Powder for Oral Solution 1 capful (17g) daily, mixed in 4-8oz of liquid daily     Multivitamin/Mineral Supplement Caplet Take 1 tablet(s) by mouth daily     Aspirin (ASA) 81mg Tablets, Enteric Coated 1 tab daily     Clindamycin HCl 300mg Capsules 1 Capsule TID for 14 days         OBJECTIVE:        Vitals:         Current: 2/12/2018 10:10:19 AM    Ht:  5 ft, 4 in;  Wt: 147 lbs;  BMI: 25.2    T: 97.2 F (oral);  BP: 148/73 mm Hg (left arm, sitting);  P: 55 bpm (left arm (BP Cuff), sitting);  sCr: 0.75 mg/dL;  GFR: 50.95        Repeat:     10:20:01 AM     BP:   130/82mm Hg (left arm, sitting)         Exams:     PHYSICAL EXAM:     GENERAL: vital signs recorded - well developed, well nourished;  no apparent distress;     EYES: extraocular movements intact; conjunctiva and cornea are normal; PERRL;     E/N/T:  normal EACs, TMs, nasal/oral mucosa, teeth, gingiva, and oropharynx;     NECK: trachea is midline; thyroid is non-palpable;     RESPIRATORY: normal respiratory rate and pattern with no distress; normal breath sounds with no rales, rhonchi, wheezes or rubs;     CARDIOVASCULAR: normal rate; rhythm is regular;  no systolic murmur; trace pedal edema;     GASTROINTESTINAL: nontender;     LYMPHATIC: no enlargement of cervical or facial nodes;     MUSCULOSKELETAL: gait: uses a cane;  normal overall tone     NEUROLOGIC: GROSSLY INTACT     PSYCHIATRIC:  appropriate affect and demeanor; normal speech pattern; grossly normal memory;         ASSESSMENT           272.0   E78.00  High cholesterol              DDx:      401.1   I10  HTN              DDx:     V13.09   N30.00  History of recurrent UTI's              DDx:     780.79   R53.1  Generalized weakness              DDx:         ORDERS:         Lab Orders:       [id number]  COMP - [facility] Comp. Metabolic Panel  (Send-Out)         90210  LPDP - [facility] Lipid Panel  (Send-Out)         02181  BDCBC - [facility] CBC with 3 part diff  (Send-Out)         78772  TSH - [facility] TSH  (Send-Out)         43154  BDUAM - [facility] Urinalysis, automated, with micro  (Send-Out)                   PLAN:          High cholesterol     LABORATORY:  Labs ordered to be performed today include Comprehensive metabolic panel and lipid panel.      FOLLOW-UP: Schedule a follow-up visit in 6 months..            Orders:       [id number]  COMP - [facility] Comp. Metabolic Panel  (Send-Out)         22671  LPDP - [facility] Lipid Panel  (Send-Out)            HTN     LABORATORY:  Labs ordered to be performed today include CBC and TSH.            Orders:       [id number]  BDCBC - [facility] CBC with 3 part diff  (Send-Out)         48751  TSH - [facility] TSH  (Send-Out)            History of recurrent UTI's     LABORATORY:  Labs ordered to be performed today include urinalysis with micro.            Orders:       [id number]  BDUAM - [facility] Urinalysis, automated, with micro  (Send-Out)            Generalized weakness         WE WILL ASK HOME HEALTH TO SEE HER AGAIN.              Patient Recommendations:        For  High cholesterol:     Schedule a follow-up visit in 6 months.                APPOINTMENT INFORMATION:        Monday Tuesday Wednesday Thursday Friday Saturday Sunday            Time:___________________AM  PM   Date:_____________________         For  Generalized weakness:     I also recommend WE WILL ASK HOME HEALTH TO SEE HER AGAIN..              CHARGE CAPTURE           **Please note: ICD descriptions below are intended for billing purposes only and may not represent clinical diagnoses**        Primary Diagnosis:         272.0 High cholesterol            E78.00     Pure hypercholesterolemia, unspecified              Orders:          62127   Office/outpatient visit; established patient, level 4  (In-House)           401.1 HTN            I10    Essential (primary) hypertension    V13.09 History of recurrent UTI's            N30.00    Acute cystitis without hematuria    780.79 Generalized weakness            R53.1    Weakness

## 2021-05-18 NOTE — PROGRESS NOTES
Angelica Laughlin  08/02/1928     Office/Outpatient Visit    Visit Date: Mon, Jan 11, 2021 10:54 am    Provider: Etienne Grajeda MD (Assistant: Francesca Soria LPN)    Location: [facility]        Electronically signed by Etienne Grajeda MD on  01/11/2021 05:13:33 PM                             Subjective:        CC: oxy was d/c'd Ms. Laughlin is a 89 year old White female.  She is here today following a transition of care from an inpatient hospital: [facility] for Seizure, Toxic Metabolic Encephalapathy. The patient was admitted on 12/29/2020. D/C'd 1/1/2021. Our office called the patient within 48 hours of discharge and scheduled the follow-up appointment.. During the patient's hospital stay the patient was treated by Dr. Barry.  The patient is accompanied into the exam room by her whose name is Aretha.  doximity [phone] ([place] Cell phone number); Today's encounter is being done with a telehealth visit. She has consented verbally with two witnesses for todays treatment. Todays visit is being conducted by audio and video. Individuals present during the telemedicine consultation include ANGELICA PRESTONENIG, NGOC LEOSAN (DAUGHTER), STAFF NURSE, ETIENNE GRAJEDA MD         HPI:           Ms. Laughlin presents in follow up from hospital admission She was diagnosed with WEAKNESS, TREMOR, ALTERED MENTAL STATUS.  The following radiology tests were done: head CT.  The patient's course has been stable and nonprogressive.  It is of moderate intensity.  NEG W/U.  AT BASELINE NOW.  WILL SEE NEURO AGAIN SOON.        (Mild)     Chronic venous insufficiency details; this has been present for several months.  The swelling has primarily involved the lower extremities.  The degree of edema has been getting better recently.  She denies associated symptoms.  BETTER SINCE ELEVATING LEGS MORE           Concerning anxiety disorder, unspecified, her anxiety disorder was originally diagnosed several months ago.  Her  "symptom complex includes apprehension.  Current treatment includes a benzodiazepine, an antidepressant, and SEROQUEL.  DOING BETTER AFTER INPATIENT TREATMENT     ROS:     CONSTITUTIONAL:  Negative for chills and fever.      CARDIOVASCULAR:  Positive for pedal edema ( mild; CHRONIC, WAXES AND WANES, BETTER WITH ELEVATION ).   Negative for chest pain or palpitations.      RESPIRATORY:  Negative for recent cough and dyspnea.      GASTROINTESTINAL:  Positive for constipation ( MIRALAX HELPS ).   Negative for abdominal pain, nausea or vomiting.      MUSCULOSKELETAL:  Positive for arthralgias and (DIFFUSE) back pain ( chronic; TYLENOL HELPS ).      NEUROLOGICAL:  Positive for \"UNSTEADY\".   Negative for headaches or weakness.      PSYCHIATRIC:  Negative for depression and sleep disturbance.          Past Medical History / Family History / Social History:         Last Reviewed on 2021 05:01 PM by Etienne Grajeda    Past Medical History:             PAST MEDICAL HISTORY             GYNECOLOGICAL HISTORY:                 ADVANCE DIRECTIVES: Living will         PREVENTIVE HEALTH MAINTENANCE             BONE DENSITY: was last done  with the following abnormality noted-- Osteoporosis     COLORECTAL CANCER SCREENING: Up to date (colonoscopy q10y; sigmoidoscopy q5y; Cologuard q3y) was last done , Results are in chart; colonoscopy with the following abnormalities noted-- Polyp(s) and Diverticulosis     Hepatitis C Medicare Screening: Not indicated     MAMMOGRAM: no longer indicated         Surgical History:         Appendectomy    Cataract Removal: bilateral;     Tonsillectomy/Adenoidectomy     Hysterectomy: CO MPLETE;;;     Skin Ca Removal-Face;    VEIN STRIPPING RIGHT LEG;;;    HEMORRHOIDECTOMY;;;         Family History:     Father: Cerebrovascular Accident     Mother: Colon Cancer     Maternal Grandfather: Colon Cancer     Maternal Grandmother: Type 2 Diabetes     Positive for Breast Cancer ( mat. aunt " ) and Colon Cancer ( pat. uncle ).          Social History:     Occupation:    Retired     Marital Status:          Tobacco/Alcohol/Supplements:     Last Reviewed on 1/11/2021 05:01 PM by Etienne Grajeda    Tobacco: She has never smoked.          Alcohol: Frequency: Socially         Substance Abuse History:     Last Reviewed on 11/25/2020 03:39 PM by Etienne Grajeda    None         Current Problems:     Last Reviewed on 1/11/2021 05:01 PM by Etienne Grajeda    Osteoporosis    Radiculopathy, lumbosacral region    Constipation, unspecified    Cystocele, midline    History of recurrent UTI's    Diverticulosis    Chronic venous insufficiency    Pure hypercholesterolemia, unspecified    Essential (primary) hypertension    Acne rosacea    Insomnia    Hearing difficulties    Vertigo (mild, chronic)    Dry mouth, unspecified    History of colonic polyps    Allergies    Pulsatile tinnitus, bilateral    Dyspnea, unspecified    Tremor, unspecified    Anxiety disorder, unspecified    Polyneuropathy, unspecified    Encounter for follow-up examination after completed treatment for conditions other than malignant neoplasm    Altered mental status, unspecified        Immunizations:     Influenza, high dose seasonal (FLUZONE HIGH-DOSE 9586-5244) 11/20/2019    zzPrevnar-13 3/4/2015    Flulaval 10/5/2010    Influenza A (H1N1), IM (3+ years) Monovalent 1/26/2010    Pneomovax 10/5/2010    Fluzone High-Dose pf (>=65 yr) 10/1/2017    Fluzone High-Dose pf (>=65 yr) 10/23/2018        Allergies:     Last Reviewed on 1/11/2021 05:01 PM by Etienne Grajeda    Sulfonamides:      tape adhesive:      Codeine:      dicyclomine:          Current Medications:     Last Reviewed on 1/11/2021 05:01 PM by Etienne Grajeda    Vitamin D3 1,000 unit oral capsule [once daily ]    Remeron 30 mg oral tablet [take 1 tablet (30 mg) by oral route once daily before bedtime]    TylenoL 325 mg oral capsule [2 po Q 8 Hr  prn pain or fever]    Depakote 250 mg oral tablet, delayed release (enteric coated) [take 1 tablet (250 mg) by oral route 2 times per day]    Keppra 250 mg oral tablet [take 1 tablet  2 times per day]    sertraline 100 mg oral tablet [take 1 tablet (100 mg) by oral route once daily]    Miralax 17 gram/dose oral Powder [1 capful (17g) daily, mixed in 4-8oz of liquid daily]    aspirin 81 mg oral tablet, delayed release (enteric coated) [1 tab daily]    amLODIPine 5 mg oral tablet [TAKE 1 TABLET(S) BY MOUTH DAILY]    nadoloL 40 mg oral tablet [TAKE 1 TABLET(S) BY MOUTH DAILY]    Multivitamin/Mineral Supplement  Caplet [Take 1 tablet(s) by mouth daily]    oxyCODONE-acetaminophen 5-325 mg oral tablet [take 1 tablet Q 12 hours as needed for pain]        Objective:        Exams:     PHYSICAL EXAM:     GENERAL: vital signs recorded - well developed, well nourished;  no apparent distress;     RESPIRATORY: normal respiratory rate and pattern with no distress;     PSYCHIATRIC:  appropriate affect and demeanor; normal speech pattern; grossly normal memory;         Assessment:         R41.82   Altered mental status, unspecified       459.81   Chronic venous insufficiency   (Mild)     F41.9   Anxiety disorder, unspecified           Plan:         Altered mental status, unspecified        RECOMMENDATIONS given include: SEE NEUROLOGY AGAIN AS PLANNED.      FOLLOW-UP: Schedule a follow-up visit in 3 months.      IMPROVED ON CURRENT MEDS, EEG WAS NEGATIVE BUT IS STILL BEING TREATED FOR PRESUMED SEIZURES.  Observe as improved Old records reviewed         Chronic venous insufficiency        RECOMMENDATIONS given include: elevation of the legs as much as possible, restriction of sodium intake, use of support hose, and CONSIDER FURTHER WORKUP.          Anxiety disorder, unspecified        MEDICATIONS: (no change to current medication regimen)     STABLE ON CURRENT MEDS, LIST REVIEWED             Patient Recommendations:        For   Altered mental status, unspecified:    Schedule a follow-up visit in 3 months.  I also recommend IMPROVED ON CURRENT MEDS, EEG WAS NEGATIVE BUT IS STILL BEING TREATED FOR PRESUMED SEIZURES..          For  Chronic venous insufficiency:    Elevate the swollen extremity as much as possible to reduce swelling. Restrict the use of salt in your diet. Begin wearing support hose/stockings to improve the circulation in your legs and decrease swelling.              Charge Capture:         Primary Diagnosis:     R41.82  Altered mental status, unspecified           Orders:      05528  Transitional care manage service 14 day discharge  (In-House)              459.81  Chronic venous insufficiency     F41.9  Anxiety disorder, unspecified

## 2021-05-18 NOTE — PROGRESS NOTES
"Manju Laughlin  08/02/1928     Office/Outpatient Visit    Visit Date: Wed, Nov 20, 2019 01:13 pm    Provider: Etienne Grajeda MD (Assistant: Lenore Elizabeth, )    Location: [facility], [place]        Electronically signed by Etienne Grajeda MD on  11/21/2019 04:24:06 PM                             Subjective:        CC: Ms. Laughlin is a 89 year old White female.  This is a follow-up visit.          HPI:           Patient presents with essential (primary) hypertension.  Her current cardiac medication regimen includes a beta-blocker and a calcium channel blocker.  Compliance with treatment has been good.  She has not kept a blood pressure diary, but states that pressures have been well controlled.            Pure hypercholesterolemia, unspecified details; current treatment includes a lipid lowering agent.  Compliance with treatment has been good.  Most recent lab tests include Total Cholesterol:  165 (mg/dL) (06/20/2017), HDL:  53 (mg/dL) (06/20/2017), Triglycerides:  240 (mg/dL) (06/20/2017), LDL:  64 (mg/dL) (06/20/2017).      ROS:     CONSTITUTIONAL:  Negative for chills and fever.      E/N/T:  Positive for tinnitus, HEARS \"MUSIC\" OFF AND ON frequent rhinorrhea ( CHRONIC ) and hoarseness ( MILD ).   Negative for ear pain or sore throat.      CARDIOVASCULAR:  Positive for pedal edema ( mild; CHRONIC, WAXES AND WANES, BETTER WITH ELEVATION ).   Negative for chest pain or palpitations.      RESPIRATORY:  Negative for recent cough and dyspnea.      GASTROINTESTINAL:  Positive for constipation ( MIRALAX HELPS ).   Negative for abdominal pain, nausea or vomiting.      GENITOURINARY:  Negative for dysuria and hematuria.      MUSCULOSKELETAL:  Positive for arthralgias and (DIFFUSE) back pain ( chronic; TYLENOL HELPS ).      INTEGUMENTARY/BREAST:  Positive for ITCHING PAPULAR RASH ON THE LOWER CHEST FOR THE PAST MONTH..      NEUROLOGICAL:  Positive for dizziness ( CHRONIC, WAXES AND WANES, HAS SEEN ENT FOR " "THIS BEFORE. ), paresthesia ( bilateral lower extremity; S/P NEUROLOGY EVAL AND EMG. ), weakness ( generalized ) and \"UNSTEADY\".   Negative for headaches.      PSYCHIATRIC:  Positive for insomnia; OTC MELATONIN HELPS.   Negative for depression.          Past Medical History / Family History / Social History:         Last Reviewed on 2019 01:36 PM by Etienne Grajeda    Past Medical History:             PAST MEDICAL HISTORY             GYNECOLOGICAL HISTORY:                 ADVANCE DIRECTIVES: Living will         PREVENTIVE HEALTH MAINTENANCE             BONE DENSITY: was last done  with the following abnormality noted-- Osteoporosis     COLORECTAL CANCER SCREENING: Up to date (colonoscopy q10y; sigmoidoscopy q5y; Cologuard q3y) was last done , Results are in chart; colonoscopy with the following abnormalities noted-- Polyp(s) and Diverticulosis     Hepatitis C Medicare Screening: Not indicated     MAMMOGRAM: no longer indicated         Surgical History:         Appendectomy    Cataract Removal: bilateral;     Tonsillectomy/Adenoidectomy     Hysterectomy: CO MPLETE;;;     Skin Ca Removal-Face;    VEIN STRIPPING RIGHT LEG;;;    HEMORRHOIDECTOMY;;;         Family History:     Father: Cerebrovascular Accident     Mother: Colon Cancer     Maternal Grandfather: Colon Cancer     Maternal Grandmother: Type 2 Diabetes     Positive for Breast Cancer ( mat. aunt ) and Colon Cancer ( pat. uncle ).          Social History:     Occupation:    Retired     Marital Status:          Tobacco/Alcohol/Supplements:     Last Reviewed on 2019 01:36 PM by Etienne Grajeda    Tobacco: She has never smoked.          Alcohol: Frequency: Socially         Current Problems:     Last Reviewed on 2019 01:38 PM by Etienne Grajeda    Benign essential tremor    Osteoporosis    Radiculopathy, lumbosacral region    Chronic low back pain    Constipation, unspecified    Cystocele, midline    " History of recurrent UTI's    Diverticulosis    Chronic constipation    HTN    High cholesterol    Chronic venous insufficiency    Pure hypercholesterolemia, unspecified    Essential (primary) hypertension    Acne rosacea    Insomnia    Hearing difficulties    Other specified idiopathic peripheral neuropathy    Vertigo (mild, chronic)    Dry mouth, unspecified    Xerostomia    Weakness    Generalized weakness    Allergies    History of colonic polyps        Immunizations:     zzPrevnar-13 3/4/2015    Flulaval 10/5/2010    Influenza A (H1N1), IM (3+ years) Monovalent 1/26/2010    Pneomovax 10/5/2010    Fluzone High-Dose pf (>=65 yr) 10/1/2017    Fluzone High-Dose pf (>=65 yr) 10/23/2018        Allergies:     Last Reviewed on 11/20/2019 01:36 PM by Etienne Grajeda    Sulfonamides:      tape adhesive:      Codeine:          Current Medications:     Last Reviewed on 11/20/2019 01:37 PM by Etienne Grajeda    Miralax 17 gram/dose oral Powder [1 capful (17g) daily, mixed in 4-8oz of liquid daily]    aspirin 81 mg oral tablet, delayed release (enteric coated) [1 tab daily]    Nadolol 40 mg oral tablet [Take 1 tablet(s) by mouth daily]    Norvasc 5mg Tablet [Take 1 tablet(s) by mouth daily]    Zocor 20mg Tablet [1 tab daily]    Multivitamin/Mineral Supplement  Caplet [Take 1 tablet(s) by mouth daily]    Allegra Allergy 12 Hour     OTC Sleep Aid      Vitamin D3 1,000 unit oral capsule [once daily ]        Objective:        Vitals:         Current: 11/20/2019 1:21:26 PM    Ht:  5 ft, 4 in;  Wt: 147.4 lbs;  BMI: 25.3T: 97.5 F (oral);  BP: 144/60 mm Hg (left arm, sitting);  P: 56 bpm (left arm (BP Cuff), sitting);  sCr: 0.92 mg/dL;  GFR: 40.03        Exams:     PHYSICAL EXAM:     GENERAL: vital signs recorded - well developed, well nourished;  no apparent distress;     EYES: conjunctiva and cornea are normal;     E/N/T:  normal EACs, TMs, nasal/oral mucosa, teeth, gingiva, and oropharynx;     NECK: trachea is  midline; thyroid is non-palpable;     RESPIRATORY: normal respiratory rate and pattern with no distress; normal breath sounds with no rales, rhonchi, wheezes or rubs;     CARDIOVASCULAR: normal rate; rhythm is regular;  no systolic murmur;    Peripheral Pulses: dorsalis pedis: 1+ L and 1+ R;  1+ pedal and RIGHT > LEFT edema; NEG CYDNEY'S;     GASTROINTESTINAL: nontender;     LYMPHATIC: no enlargement of cervical or facial nodes; no supraclavicular nodes;     MUSCULOSKELETAL: gait: slowed and uses a walker;  normal overall tone     NEUROLOGIC: GROSSLY INTACT     PSYCHIATRIC:  appropriate affect and demeanor; normal speech pattern; grossly normal memory;         Assessment:         I10   Essential (primary) hypertension       E78.00   Pure hypercholesterolemia, unspecified       Z23   Encounter for immunization       H93.A3   Pulsatile tinnitus, bilateral           ORDERS:         Meds Prescribed:       [Refilled] Nadolol 40 mg oral tablet [Take 1 tablet(s) by mouth daily], #90 (ninety) tablets, Refills: 1 (one)       [Refilled] Norvasc 5 mg oral tablet [Take 1 tablet(s) by mouth daily], #90 (ninety) tablets, Refills: 1 (one)       [Refilled] Zocor 20 mg oral tablet [1 tab daily], #90 (ninety) tablets, Refills: 1 (one)         Procedures Ordered:       31674  Fluzone High Dose  (In-House)              Other Orders:         Administration of influenza virus vaccine  (x1)                  Plan:         Essential (primary) hypertension        MEDICATIONS: (no change to current medication regimen)     FOLLOW-UP: Schedule a follow-up visit in 6 months.      [facility] TODAY           Prescriptions:       [Refilled] Nadolol 40 mg oral tablet [Take 1 tablet(s) by mouth daily], #90 (ninety) tablets, Refills: 1 (one)       [Refilled] Norvasc 5 mg oral tablet [Take 1 tablet(s) by mouth daily], #90 (ninety) tablets, Refills: 1 (one)         Pure hypercholesterolemia, unspecified          Prescriptions:       [Refilled]  Zocor 20 mg oral tablet [1 tab daily], #90 (ninety) tablets, Refills: 1 (one)         Encounter for immunization        MEDICATIONS: (no change to current medication regimen)     IMMUNIZATIONS given today: Influenza HIGH Dose.            Immunizations:       48846  Fluzone High Dose  (In-House)                Dose (ml): 0.5  Site: left deltoid  Route: intramuscular  Administered by: Lenore Elizabeth          : [employer]  Lot #: [id number]  Exp: 05/26/2020          NDC: [address]-59        Administration of influenza virus vaccine  (x1)          Pulsatile tinnitus, bilateral        FAMILY MAY LOOK INTO NOISE-CANCELLATION EAR PLUGS.              Patient Recommendations:        For  Essential (primary) hypertension:    Schedule a follow-up visit in 6 months.          For  Pulsatile tinnitus, bilateral:    I also recommend FAMILY MAY LOOK INTO NOISE-CANCELLATION EAR PLUGS..              Charge Capture:         Primary Diagnosis:     I10  Essential (primary) hypertension           Orders:      68912  Office/outpatient visit; established patient, level 4  (In-House)              E78.00  Pure hypercholesterolemia, unspecified     Z23  Encounter for immunization           Orders:      [id number]  Fluzone High Dose  (In-House)              Administration of influenza virus vaccine  (x1)          H93.A3  Pulsatile tinnitus, bilateral

## 2021-05-18 NOTE — PROGRESS NOTES
"Manju Laughlin 08/02/1928     Office/Outpatient Visit    Visit Date: Tue, Apr 23, 2019 01:12 pm    Provider: Etienne Grajeda MD (Assistant: Ana Mei MA)    Location: [facility]        Electronically signed by Etienne Grajeda MD on  04/23/2019 04:20:35 PM                             SUBJECTIVE:        CC:     Ms. Laughlin is a 89 year old White female.  This is a follow-up visit.  She presents with discuss medication & refills.          HPI:         Patient to be evaluated for hTN.  Her current cardiac medication regimen includes a beta-blocker and a calcium channel blocker.  She has not kept a blood pressure diary, but states that pressures have been well controlled.  Compliance with treatment has been good.      ROS:     CONSTITUTIONAL:  Negative for chills and fever.      E/N/T:  Positive for frequent rhinorrhea ( CHRONIC ).   Negative for ear pain or sore throat.      CARDIOVASCULAR:  Positive for pedal edema ( mild; CHRONIC, WAXES AND WANES, BETTER WITH ELEVATION ).   Negative for chest pain or palpitations.      RESPIRATORY:  Negative for recent cough and dyspnea.      GASTROINTESTINAL:  Positive for constipation ( MIRALAX HELPS ).   Negative for abdominal pain, nausea or vomiting.      GENITOURINARY:  Negative for dysuria and hematuria.      MUSCULOSKELETAL:  Positive for arthralgias and (DIFFUSE) back pain ( chronic; TYLENOL HELPS ).      INTEGUMENTARY/BREAST:  Positive for ITCHING PAPULAR RASH ON THE LOWER CHEST FOR THE PAST MONTH..      NEUROLOGICAL:  Positive for dizziness ( CHRONIC, WAXES AND WANES, HAS SEEN ENT FOR THIS BEFORE. ), paresthesia ( bilateral lower extremity; S/P NEUROLOGY EVAL AND EMG. ), weakness ( generalized ) and \"UNSTEADY\".   Negative for headaches.      PSYCHIATRIC:  Positive for insomnia; OTC MELATONIN HELPS.   Negative for depression.          PMH/FMH/SH:     Last Reviewed on 4/23/2019 03:44 PM by Etienne Grajeda    Past Medical History:             PAST " MEDICAL HISTORY             GYNECOLOGICAL HISTORY:                 ADVANCE DIRECTIVES: Living will         PREVENTIVE HEALTH MAINTENANCE             BONE DENSITY: was last done  with the following abnormality noted-- Osteoporosis     COLORECTAL CANCER SCREENING: Up to date (colonoscopy q10y; sigmoidoscopy q5y; Cologuard q3y) was last done , Results are in chart; colonoscopy with the following abnormalities noted-- Polyp(s) and Diverticulosis     Hepatitis C Medicare Screening: Not indicated     MAMMOGRAM: no longer indicated         Surgical History:         Appendectomy    Cataract Removal: bilateral;     Tonsillectomy/Adenoidectomy      Hysterectomy: CO MPLETE;;;     Skin Ca Removal-Face;    VEIN STRIPPING RIGHT LEG;;;    HEMORRHOIDECTOMY;;;         Family History:     Father: Cerebrovascular Accident     Mother: Colon Cancer     Maternal Grandfather: Colon Cancer     Maternal Grandmother: Type 2 Diabetes     Positive for Breast Cancer ( mat. aunt ) and Colon Cancer ( pat. uncle ).          Social History:     Occupation:    Retired     Marital Status:          Tobacco/Alcohol/Supplements:     Last Reviewed on 2019 03:40 PM by Etienne Grajeda    Tobacco: She has never smoked.          Alcohol: Frequency: Socially             Current Problems:     Last Reviewed on 2019 03:46 PM by Etienne Grajeda    Allergies     History of colonic polyps     Generalized weakness     Xerostomia     Insomnia     Vertigo (mild, chronic)     Hearing difficulties     Other specified idiopathic peripheral neuropathy     Acne rosacea     Chronic venous insufficiency     Diverticulosis     HTN     High cholesterol     Chronic constipation     History of recurrent UTI's     Cystocele, midline     Chronic low back pain     Osteoporosis     Benign essential tremor     Rash         Immunizations:     zzPrevnar-13 3/4/2015     Flulaval 10/5/2010     Influenza A (H1N1), IM (3+ years) Monovalent  1/26/2010     Pneomovax 10/5/2010     Fluzone High-Dose pf (>=65 yr) 10/1/2017     Fluzone High-Dose pf (>=65 yr) 10/23/2018         Allergies:     Last Reviewed on 4/23/2019 03:40 PM by Etienne Grajeda    Sulfonamides:    Codeine:    tape adhesive:        Current Medications:     Last Reviewed on 4/23/2019 03:45 PM by Etienne Grajeda    Nadolol 40mg Tablet Take 1 tablet(s) by mouth daily     Norvasc 5mg Tablet Take 1 tablet(s) by mouth daily     Zocor 20mg Tablet 1 tab daily     Multivitamin/Mineral Supplement Caplet Take 1 tablet(s) by mouth daily     Aspirin (ASA) 81mg Tablets, Enteric Coated 1 tab daily     Miralax Powder for Oral Solution 1 capful (17g) daily, mixed in 4-8oz of liquid daily     Allegra Allergy 12 Hour     OTC Sleep Aid     Valtrex 1gm Tablet 1 po TID for 7 days         OBJECTIVE:        Vitals:         Current: 4/23/2019 1:20:56 PM    Ht:  5 ft, 4 in;  Wt: 150 lbs;  BMI: 25.7    T: 97.6 F (oral);  BP: 146/54 mm Hg (left arm, sitting);  P: 62 bpm (left arm (BP Cuff), sitting);  sCr: 0.92 mg/dL;  GFR: 41.11        Exams:     PHYSICAL EXAM:     GENERAL: vital signs recorded - well developed, well nourished;  no apparent distress;     NECK: trachea is midline; thyroid is non-palpable;     RESPIRATORY: normal respiratory rate and pattern with no distress; normal breath sounds with no rales, rhonchi, wheezes or rubs;     CARDIOVASCULAR: normal rate; rhythm is regular;  no systolic murmur; trace pedal edema;     GASTROINTESTINAL: nontender;     LYMPHATIC: no enlargement of cervical or facial nodes;     BREAST/INTEGUMENT: a rash is noted on the chest;  it is best characterized as papular and vesicular;     NEUROLOGIC: GROSSLY INTACT         ASSESSMENT           401.1   I10  HTN              DDx:     782.1   R21  Rash POSSIBLE ZOSTER VARIANT              DDx:         ORDERS:         Meds Prescribed:       Refill of: Nadolol 40mg Tablet Take 1 tablet(s) by mouth daily  #90 (Ninety)  tablet(s) Refills: 1       Refill of: Norvasc (Amlodipine ) 5mg Tablet Take 1 tablet(s) by mouth daily  #90 (Ninety) tablet(s) Refills: 1       Refill of: Zocor (Simvastatin) 20mg Tablet 1 tab daily  #90 (Ninety) tablet(s) Refills: 1       Refill of: Valtrex (Valacyclovir) 1gm Tablet 1 po TID for 7 days  #21 (Twenty One) tablet(s) Refills: 0                 PLAN:          HTN         MEDICATIONS: (no change to current medication regimen)     FOLLOW-UP: Schedule a follow-up visit in 6 months.      [facility] TODAY           Prescriptions:       Refill of: Nadolol 40mg Tablet Take 1 tablet(s) by mouth daily  #90 (Ninety) tablet(s) Refills: 1       Refill of: Norvasc (Amlodipine ) 5mg Tablet Take 1 tablet(s) by mouth daily  #90 (Ninety) tablet(s) Refills: 1          Rash         CONSIDER DERM EVAL           Prescriptions:       Refill of: Valtrex (Valacyclovir) 1gm Tablet 1 po TID for 7 days  #21 (Twenty One) tablet(s) Refills: 0             Other Prescriptions:       Refill of: Zocor (Simvastatin) 20mg Tablet 1 tab daily  #90 (Ninety) tablet(s) Refills: 1         Patient Recommendations:        For  HTN:     Schedule a follow-up visit in 6 months.          For  Rash:     I also recommend CONSIDER DERM EVAL.              CHARGE CAPTURE           **Please note: ICD descriptions below are intended for billing purposes only and may not represent clinical diagnoses**        Primary Diagnosis:         401.1 HTN            I10    Essential (primary) hypertension              Orders:          58718   Office/outpatient visit; established patient, level 4  (In-House)           782.1 Rash            R21    Rash and other nonspecific skin eruption

## 2021-05-18 NOTE — PROGRESS NOTES
TrumanAngelica QI 08/02/1928     Domiciliary, Rest Home (eg, Boarding Home), or jail Care Services    Visit Date: Mon, Aug 24, 2020 12:00 am    Provider: Etienne Grajeda MD (Assistant: Spike Toure,  )    Location:         Electronically signed by Etienne Grajeda MD on  08/24/2020 05:17:35 PM                             Subjective:        CC: ([id number] [facility] is a 89 year old White female.  She is here today following a transition of care from an inpatient hospital: [facility] for convulsions. The patient was admitted on 8/18/20-8/19/20. Our office called the patient within 48 hours of discharge and scheduled the follow-up appointment.. During the patient's hospital stay the patient was treated by Dr. Melvin.  Today's encounter is being done with a telehealth visit. She has consented verbally with two witnesses for todays treatment. Todays visit is being conducted by audio and video. Individuals present during the telemedicine consultation include ANGELICA BROWN, NGOC GRAJEDA (DAUGHTER), ETIENNE GRAJEDA MD         HPI:           Hospice presents in follow up from hospital admission She was diagnosed with WEAKNESS, TREMOR, ALTERED MENTAL STATUS.  The following radiology tests were done: head CT.  The patient's course has been stable and nonprogressive.  NEG W/U SO FAR NEUROLOGY W/U IN PROGRESS.  BENZOS HELP S/W     ROS:     CONSTITUTIONAL:  Negative for chills and fever.      CARDIOVASCULAR:  Positive for pedal edema ( mild; CHRONIC, WAXES AND WANES, BETTER WITH ELEVATION ).   Negative for chest pain or palpitations.      RESPIRATORY:  Negative for recent cough and dyspnea.      GASTROINTESTINAL:  Positive for constipation ( MIRALAX HELPS ).   Negative for abdominal pain, nausea or vomiting.      MUSCULOSKELETAL:  Positive for arthralgias, (DIFFUSE) back pain ( chronic; TYLENOL HELPS ) and ASLO NECK PAIN, LEFT SHOULDER AND ELB OW PAIN AFTER A RECENT FALL.      NEUROLOGICAL:  Positive for  "\"UNSTEADY\".   Negative for headaches or weakness.      PSYCHIATRIC:  Negative for depression and sleep disturbance.          Past Medical History / Family History / Social History:         Last Reviewed on 2020 05:07 PM by Etienne Grajeda    Past Medical History:             PAST MEDICAL HISTORY             GYNECOLOGICAL HISTORY:                 ADVANCE DIRECTIVES: Living will         PREVENTIVE HEALTH MAINTENANCE             BONE DENSITY: was last done  with the following abnormality noted-- Osteoporosis     COLORECTAL CANCER SCREENING: Up to date (colonoscopy q10y; sigmoidoscopy q5y; Cologuard q3y) was last done , Results are in chart; colonoscopy with the following abnormalities noted-- Polyp(s) and Diverticulosis     Hepatitis C Medicare Screening: Not indicated     MAMMOGRAM: no longer indicated         Surgical History:         Appendectomy    Cataract Removal: bilateral;     Tonsillectomy/Adenoidectomy     Hysterectomy: CO MPLETE;;;     Skin Ca Removal-Face;    VEIN STRIPPING RIGHT LEG;;;    HEMORRHOIDECTOMY;;;         Family History:     Father: Cerebrovascular Accident     Mother: Colon Cancer     Maternal Grandfather: Colon Cancer     Maternal Grandmother: Type 2 Diabetes     Positive for Breast Cancer ( mat. aunt ) and Colon Cancer ( pat. uncle ).          Social History:     Occupation:    Retired     Marital Status:          Tobacco/Alcohol/Supplements:     Last Reviewed on 2020 05:07 PM by Etienne Grajeda    Tobacco: She has never smoked.          Alcohol: Frequency: Socially         Current Problems:     Last Reviewed on 2020 05:07 PM by Etienne Grajeda    Osteoporosis    Radiculopathy, lumbosacral region    Constipation, unspecified    Cystocele, midline    History of recurrent UTI's    Diverticulosis    Chronic venous insufficiency    Pure hypercholesterolemia, unspecified    Essential (primary) hypertension    Acne rosacea    Insomnia    " Hearing difficulties    Vertigo (mild, chronic)    Dry mouth, unspecified    Weakness    History of colonic polyps    Allergies    Pulsatile tinnitus, bilateral    Dyspnea, unspecified    Tremor, unspecified    Anxiety disorder, unspecified    Polyneuropathy, unspecified        Immunizations:     Influenza, high dose seasonal (FLUZONE HIGH-DOSE 3449-2363) 11/20/2019    zzPrevnar-13 3/4/2015    Flulaval 10/5/2010    Influenza A (H1N1), IM (3+ years) Monovalent 1/26/2010    Pneomovax 10/5/2010    Fluzone High-Dose pf (>=65 yr) 10/1/2017    Fluzone High-Dose pf (>=65 yr) 10/23/2018        Allergies:     Last Reviewed on 8/24/2020 05:07 PM by Etienne Grajeda    Sulfonamides:      tape adhesive:      Codeine:          Current Medications:     Last Reviewed on 8/24/2020 05:07 PM by Etienne Grajeda    Vitamin D3 1,000 unit oral capsule [once daily ]    Remeron 30 mg oral tablet [take 1 tablet (30 mg) by oral route once daily before bedtime]    TylenoL 325 mg oral capsule [2 po Q 8 Hr prn pain or fever]    Miralax 17 gram/dose oral Powder [1 capful (17g) daily, mixed in 4-8oz of liquid daily]    aspirin 81 mg oral tablet, delayed release (enteric coated) [1 tab daily]    nadoloL 40 mg oral tablet [TAKE 1 TABLET(S) BY MOUTH DAILY]    amLODIPine 5 mg oral tablet [TAKE 1 TABLET(S) BY MOUTH DAILY]    Multivitamin/Mineral Supplement  Caplet [Take 1 tablet(s) by mouth daily]    LORazepam 2 mg/mL oral Concentrate [take 0.5 milliliter (1 mg) po Q6 hr prn anxiety, agitation, or tremor ]    Zoloft 25 mg oral tablet [take 1 tablet (25 mg) by oral route once daily]    QUEtiapine 25 mg oral tablet [take 1 tablet (25 mg) by oral route QHS]        Objective:        Exams:     PHYSICAL EXAM:     GENERAL: vital signs recorded - well developed, well nourished;  no apparent distress;     RESPIRATORY: normal respiratory rate and pattern with no distress;     PSYCHIATRIC:  appropriate affect and demeanor; normal speech pattern;  grossly normal memory;         Assessment:         R25.1   Tremor, unspecified           Plan:         Tremor, unspecified        MEDICATIONS: (no change to current medication regimen)     RECOMMENDATIONS given include: SEE NEUROLOGY AS PLANNED.      FOLLOW-UP: Schedule a follow-up visit in 2 months.  Consider further workup Observe as improved Telehealth: Verbal consent obtained for visit to occur via televideo conferencing             Patient Recommendations:        For  Tremor, unspecified:    Schedule a follow-up visit in 2 months.              Charge Capture:         Primary Diagnosis:     R25.1  Tremor, unspecified         ADDENDUMS:      ____________________________________    Addendum: 08/27/2020 08:52 JEAN PAUL - Etienne Grajeda        ADD [id number]

## 2021-05-18 NOTE — PROGRESS NOTES
Manju Laughlin 1928     Office/Outpatient Visit    Visit Date: Wed, Sep 12, 2018 08:51 am    Provider: Etienne Grajeda MD (Assistant: Shawna George LPN)    Location: [facility]        Electronically signed by Etienne Grajeda MD on  2018 01:03:12 PM                             SUBJECTIVE:        CC:     Ms. Laughlin is a 89 year old White female.  Right leg pain, knee swollen, pain scale today 8, x 6-8 weeks.;         HPI:         Ms. Laughlin presents with knee pain.  The affected area is the right knee.  for knee pain.  Pain began 2 months ago.  The pattern of joint symptoms has been progressive worsening.  Patient does not remember the cause of the injury.      ROS:     CONSTITUTIONAL:  Negative for chills and fever.      CARDIOVASCULAR:  Positive for pedal edema ( mild; RIGHT LEG WORSE LATELY ).   Negative for chest pain or palpitations.      RESPIRATORY:  Negative for recent cough and dyspnea.      GASTROINTESTINAL:  Negative for abdominal pain, nausea and vomiting.      GENITOURINARY:  Negative for dysuria and hematuria.      MUSCULOSKELETAL:  Positive for arthralgias and (ALSO RIGHT HIP) back pain ( chronic; TYLENOL HELPS ).          PM/[facility]/SH:     Last Reviewed on 2018 09:22 AM by Etienne Grajeda    Past Medical History:             PAST MEDICAL HISTORY             GYNECOLOGICAL HISTORY:        Last mammogram was              ADVANCE DIRECTIVES: Living will         Surgical History:         Appendectomy    Cataract Removal: bilateral;     Tonsillectomy/Adenoidectomy      Hysterectomy: CO MPLETE;;;     Skin Ca Removal-Face;    VEIN STRIPPING RIGHT LEG;;;    HEMORRHOIDECTOMY;;; Procedures: colonoscopy , 10/05, , ;; Treadmill stress test --NEG;;         Family History:     Father: Cerebrovascular Accident     Mother: Colon Cancer     Maternal Grandfather: Colon Cancer     Maternal Grandmother: Type 2 Diabetes     Positive for Breast Cancer ( mat.  aunt ) and Colon Cancer ( pat. uncle ).          Social History: Retired RN     Occupation:    Retired     Marital Status:          Tobacco/Alcohol/Supplements:     Last Reviewed on 9/12/2018 09:19 AM by Etienne Grajeda    Tobacco: She has never smoked.          Alcohol: Frequency: Socially             Current Problems:     Last Reviewed on 9/12/2018 09:24 AM by Etienne Grajeda    Generalized weakness     Hip pain     Xerostomia     Insomnia     Vertigo (mild, chronic)     Hearing difficulties     Other specified idiopathic peripheral neuropathy     Acne rosacea     Chronic venous insufficiency     Diverticulosis     HTN     High cholesterol     Chronic constipation     History of recurrent UTI's     Cystocele, midline     Chronic low back pain     Osteoporosis     Benign essential tremor         Immunizations:     zzPrevnar-13 3/4/2015     Flulaval 10/5/2010     Influenza A (H1N1), IM (3+ years) Monovalent 1/26/2010     Pneomovax 10/5/2010     Fluzone High-Dose pf (>=65 yr) 10/1/2017         Allergies:     Last Reviewed on 9/12/2018 09:20 AM by Etienne Grajeda    Sulfonamides:    Codeine:    tape adhesive:        Current Medications:     Last Reviewed on 9/12/2018 09:23 AM by Etienne Grajeda    Nadolol 40mg Tablet Take 1 tablet(s) by mouth daily     Norvasc 5mg Tablet Take 1 tablet(s) by mouth daily     Zocor 20mg Tablet 1 tab daily     Multivitamin/Mineral Supplement Caplet Take 1 tablet(s) by mouth daily     Aspirin (ASA) 81mg Tablets, Enteric Coated 1 tab daily     Miralax Powder for Oral Solution 1 capful (17g) daily, mixed in 4-8oz of liquid daily         OBJECTIVE:        Vitals:         Current: 9/12/2018 8:58:10 AM    Ht:  5 ft, 4 in;  Wt: 148.2 lbs;  BMI: 25.4    T: 98.2 F (oral);  BP: 136/56 mm Hg (left arm, sitting);  P: 60 bpm (left arm (BP Cuff), sitting);  sCr: 0.92 mg/dL;  GFR: 40.90    O2 Sat: 98 %        Exams:     PHYSICAL EXAM:     GENERAL: vital signs  recorded - well developed, well nourished;  no apparent distress;     NECK: trachea is midline; thyroid is non-palpable;     RESPIRATORY: normal respiratory rate and pattern with no distress; normal breath sounds with no rales, rhonchi, wheezes or rubs;     CARDIOVASCULAR: normal rate; rhythm is regular;  no systolic murmur;    Peripheral Pulses: dorsalis pedis: 1+ L and 1+ R;  1+ pedal and RIGHT > LEFT edema; NEG CYDNEY'S;     MUSCULOSKELETAL: gait: slowed, uses a walker, and uses a cane;  normal overall tone GOOD ROM OF HIP, KNEE AND ANKLE BUT DIFFUSE TENDERNESS, MOSTLY BEHIND THE KNEE;     NEUROLOGIC: GROSSLY INTACT         ASSESSMENT           719.46   M25.561  Right Knee pain              DDx:     719.45   M25.552  Right Hip pain              DDx:     729.81   R60.0  Right Leg swelling              DDx:         ORDERS:         Meds Prescribed:       Refill of: Meloxicam 7.5mg Tablet Take 1 tablet(s) by mouth daily  #30 (Thirty) tablet(s) Refills: 1         Radiology/Test Orders:       [id number]  Right radiologic examination, knee; three views  (Send-Out)         [id number]  Right radiologic exam, hip, unilateral; complete, minimum of two views  (Send-Out)         [id number]  Venous doppler right extremity  (Send-Out)                   PLAN:         Right Knee pain         RADIOLOGY:  I have ordered a right knee x-ray to be done today.      FOLLOW-UP: Schedule follow-up appointments on a p.r.n. basis..      MEDICATIONS: I have prescribed an NSAID.      CONSIDER ORTHO EVAL           Prescriptions:       Refill of: Meloxicam 7.5mg Tablet Take 1 tablet(s) by mouth daily  #30 (Thirty) tablet(s) Refills: 1           Orders:       [id number]  Right radiologic examination, knee; three views  (Send-Out)           Right Hip pain         RADIOLOGY:  I have ordered a right hip x-ray to be done today.            Orders:       [id number]  Right radiologic exam, hip, unilateral; complete, minimum of two views  (Send-Out)           Right Leg  swelling         RADIOLOGY:  I have ordered Venous doppler right leg to be done today.            Orders:       [id number]  Venous doppler right extremity  (Send-Out)               Patient Recommendations:        For Right Knee pain:     Schedule follow-up appointments as needed.                APPOINTMENT INFORMATION:        Monday Tuesday Wednesday Thursday Friday Saturday Sunday            Time:___________________AM  PM   Date:_____________________             CHARGE CAPTURE           **Please note: ICD descriptions below are intended for billing purposes only and may not represent clinical diagnoses**        Primary Diagnosis:         719.46 Right Knee pain            M25.561    Pain in right knee              Orders:          [id number]   Office/outpatient visit; established patient, level 4  (In-House)           719.45 Right Hip pain            M25.552    Pain in left hip    729.81 Right Leg swelling            R60.0    Localized edema

## 2021-05-18 NOTE — PROGRESS NOTES
Manju Laughlin  08/02/1928     Office/Outpatient Visit    Visit Date: Fri, Jul 17, 2020 01:16 pm    Provider: Etienne Grajeda MD (Assistant: Spike Toure, )    Location: [facility]        Electronically signed by Etienne Grajeda MD on  07/17/2020 05:40:13 PM                             Subjective:        CC: Hospice is a 89 year old White female.  discuss fall and back pain, parkinsons, follow up;         HPI:           PHQ-9 Depression Screening: Completed form scanned and in chart; Total Score 7           Essential (primary) hypertension details; her current cardiac medication regimen includes a beta-blocker and a calcium channel blocker.  Compliance with treatment has been good.  She has not kept a blood pressure diary, but states that pressures have been well controlled.            Complaint of tremor, unspecified..  The symptom began several months ago.  The severity is of moderate intensity.  She estimates that the frequency of this symptom is several times a month.  The typical duration of an episode is 30 to 60 minutes.  OVERALL DOING BETTER.  HAS DECLINED CT/MRI OR NEURO EVAL.  NEG LABS, CXR AND ECG.            Additionally, she presents with history of anxiety disorder, unspecified.  her anxiety disorder was originally diagnosed several months ago.  Her symptom complex includes apprehension.  Current treatment includes a benzodiazepine, an antidepressant, and SEROQUEL.  CURRENTLY DOING WELL     ROS:     CONSTITUTIONAL:  Negative for chills and fever.      CARDIOVASCULAR:  Positive for pedal edema ( mild; CHRONIC, WAXES AND WANES, BETTER WITH ELEVATION ).   Negative for chest pain or palpitations.      RESPIRATORY:  Negative for recent cough and dyspnea.      GASTROINTESTINAL:  Positive for constipation ( MIRALAX HELPS ).   Negative for abdominal pain, nausea or vomiting.      MUSCULOSKELETAL:  Positive for arthralgias, (DIFFUSE) back pain ( chronic; TYLENOL HELPS ) and ASLO NECK  "PAIN, LEFT SHOULDER AND ELB OW PAIN AFTER A RECENT FALL.      NEUROLOGICAL:  Positive for \"UNSTEADY\".   Negative for headaches or weakness.      PSYCHIATRIC:  Negative for depression and sleep disturbance.          Past Medical History / Family History / Social History:         Last Reviewed on 2020 05:32 PM by Etienne Grajeda    Past Medical History:             PAST MEDICAL HISTORY             GYNECOLOGICAL HISTORY:                 ADVANCE DIRECTIVES: Living will         PREVENTIVE HEALTH MAINTENANCE             BONE DENSITY: was last done  with the following abnormality noted-- Osteoporosis     COLORECTAL CANCER SCREENING: Up to date (colonoscopy q10y; sigmoidoscopy q5y; Cologuard q3y) was last done , Results are in chart; colonoscopy with the following abnormalities noted-- Polyp(s) and Diverticulosis     Hepatitis C Medicare Screening: Not indicated     MAMMOGRAM: no longer indicated         Surgical History:         Appendectomy    Cataract Removal: bilateral;     Tonsillectomy/Adenoidectomy     Hysterectomy: CO MPLETE;;;     Skin Ca Removal-Face;    VEIN STRIPPING RIGHT LEG;;;    HEMORRHOIDECTOMY;;;         Family History:     Father: Cerebrovascular Accident     Mother: Colon Cancer     Maternal Grandfather: Colon Cancer     Maternal Grandmother: Type 2 Diabetes     Positive for Breast Cancer ( mat. aunt ) and Colon Cancer ( pat. uncle ).          Social History:     Occupation:    Retired     Marital Status:          Tobacco/Alcohol/Supplements:     Last Reviewed on 2020 05:32 PM by Etienne Grajeda    Tobacco: She has never smoked.          Alcohol: Frequency: Socially         Current Problems:     Last Reviewed on 2020 05:32 PM by Etienne Grajeda    Osteoporosis    Radiculopathy, lumbosacral region    Cystocele, midline    Constipation, unspecified    History of recurrent UTI's    Diverticulosis    Chronic venous insufficiency    Acne rosacea    " Pure hypercholesterolemia, unspecified    Essential (primary) hypertension    Hearing difficulties    Vertigo (mild, chronic)    Insomnia    Dry mouth, unspecified    Weakness    Allergies    History of colonic polyps    Pulsatile tinnitus, bilateral    Dyspnea, unspecified    Tremor, unspecified    Anxiety disorder, unspecified    Polyneuropathy, unspecified    Pain in left shoulder    Pain in left elbow    Cervicalgia    Encounter for screening for depression        Immunizations:     Influenza, high dose seasonal (FLUZONE HIGH-DOSE [id number]) 11/20/2019    zzPrevnar-13 3/4/2015    Flulaval 10/5/2010    Influenza A (H1N1), IM (3+ years) Monovalent 1/26/2010    Pneomovax 10/5/2010    Fluzone High-Dose pf (>=65 yr) 10/1/2017    Fluzone High-Dose pf (>=65 yr) 10/23/2018        Allergies:     Last Reviewed on 7/17/2020 05:32 PM by Etienne Grajeda    Sulfonamides:      tape adhesive:      Codeine:          Current Medications:     Last Reviewed on 7/17/2020 05:32 PM by Etienne Grajeda    Vitamin D3 1,000 unit oral capsule [once daily ]    Remeron 30 mg oral tablet [take 1 tablet (30 mg) by oral route once daily before bedtime]    TylenoL 325 mg oral capsule [2 po Q 8 Hr prn pain or fever]    amLODIPine 5 mg oral tablet [TAKE 1 TABLET(S) BY MOUTH DAILY]    nadoloL 40 mg oral tablet [TAKE 1 TABLET(S) BY MOUTH DAILY]    Miralax 17 gram/dose oral Powder [1 capful (17g) daily, mixed in 4-8oz of liquid daily]    aspirin 81 mg oral tablet, delayed release (enteric coated) [1 tab daily]    Multivitamin/Mineral Supplement  Caplet [Take 1 tablet(s) by mouth daily]    LORazepam 2 mg/mL oral Concentrate [take 0.5 milliliter (1 mg) po Q4 hr prn anxiety, agitation]    Zoloft 25 mg oral tablet [take 1 tablet (25 mg) by oral route once daily]    QUEtiapine 25 mg oral tablet [take 1 tablet (25 mg) by oral route QHS]        Objective:        Vitals:         Current: 7/17/2020 1:25:02 PM    Ht:  5 ft, 4 in;  Wt: 148.2  lbs;  BMI: 25.4T: 97.3 F (temporal);  BP: 128/52 mm Hg (right arm, sitting);  P: 56 bpm (right arm (BP Cuff), sitting);  sCr: 0.7 mg/dL;  GFR: 52.73        Exams:     PHYSICAL EXAM:     GENERAL: vital signs recorded - well developed, well nourished;  no apparent distress;     NECK: range of motion is decreased;  trachea is midline; thyroid is non-palpable;     RESPIRATORY: normal respiratory rate and pattern with no distress; normal breath sounds with no rales, rhonchi, wheezes or rubs;     CARDIOVASCULAR: normal rate; rhythm is regular;  no systolic murmur;    Peripheral Pulses: dorsalis pedis: 1+ L and 1+ R;  NEG CYDNEY'S; 1+ pedal and RIGHT > LEFT edema;     GASTROINTESTINAL: nontender;     LYMPHATIC: no enlargement of cervical or facial nodes; no supraclavicular nodes;     MUSCULOSKELETAL: gait: slowed and uses a walker;  decreased range of motion noted in: the neck;  left shoulder;  left elbow;  pain with range of motion in: the neck;  left shoulder;  left elbow;  normal overall tone TENDER LEFT DELTOID;     NEUROLOGIC: GROSSLY INTACT     PSYCHIATRIC:  appropriate affect and demeanor; normal speech pattern; grossly normal memory;         Assessment:         R25.1   Tremor, unspecified       F41.9   Anxiety disorder, unspecified       I10   Essential (primary) hypertension       M54.2   Cervicalgia       M25.512   Pain in left shoulder       M25.522   Pain in left elbow       Z13.31   Encounter for screening for depression           ORDERS:         Radiology/Test Orders:       [id number]  Radiologic examination, spine, cervical; minimum of four views  (Send-Out)            [id number]  Left Radiologic exam, shoulder; comp, 2 views  (Send-Out)            [id number]  Left radiologic examination, elbow; complete, minimum of three views  (Send-Out)              Other Orders:         Depression screen negative  (In-House)            1100F  Pt screen for fall risk; document 2+ falls in the past yr or any fall w/injury in past  year (JEFE)  (In-House)                      Plan:         Tremor, unspecified        MEDICATIONS: (no change to current medication regimen) Consider further workup Observe as improved MIPS Positive Depression Screen but after further evaluation the patient does not have a diagnosis of depression.            Orders:         Depression screen negative  (In-House)            1100F  Pt screen for fall risk; document 2+ falls in the past yr or any fall w/injury in past year (JEFE)  (In-House)              Anxiety disorder, unspecified        MEDICATIONS: (no change to current medication regimen)         Essential (primary) hypertension        MEDICATIONS: (no change to current medication regimen)         Cervicalgia        RADIOLOGY:  I have ordered a C-Spine x-ray series to be done today.            Orders:       [id number]  Radiologic examination, spine, cervical; minimum of four views  (Send-Out)              Pain in left shoulder        RADIOLOGY:  I have ordered Shoulder x-ray: left shoulder to be done today.            Orders:       [id number]  Left Radiologic exam, shoulder; comp, 2 views  (Send-Out)              Pain in left elbow        RADIOLOGY:  I have ordered a left elbow x-ray to be done today.            Orders:       [id number]  Left radiologic examination, elbow; complete, minimum of three views  (Send-Out)                  Patient Recommendations:        For  Pain in left elbow:    left elbow x-ray             Charge Capture:         Primary Diagnosis:     R25.1  Tremor, unspecified           Orders:      [id number]  Office/outpatient visit; established patient, level 4  (In-House)              Depression screen negative  (In-House)            1100F  Pt screen for fall risk; document 2+ falls in the past yr or any fall w/injury in past year (JEFE)  (In-House)              F41.9  Anxiety disorder, unspecified     I10  Essential (primary) hypertension     M54.2  Cervicalgia     M25.512  Pain in left shoulder      M25.522  Pain in left elbow     Z13.31  Encounter for screening for depression

## 2021-07-01 VITALS
WEIGHT: 147 LBS | HEART RATE: 55 BPM | SYSTOLIC BLOOD PRESSURE: 130 MMHG | DIASTOLIC BLOOD PRESSURE: 82 MMHG | HEIGHT: 64 IN | BODY MASS INDEX: 25.1 KG/M2 | TEMPERATURE: 97.2 F

## 2021-07-01 VITALS
HEIGHT: 64 IN | TEMPERATURE: 98.2 F | WEIGHT: 148.2 LBS | SYSTOLIC BLOOD PRESSURE: 136 MMHG | OXYGEN SATURATION: 98 % | HEART RATE: 60 BPM | BODY MASS INDEX: 25.3 KG/M2 | DIASTOLIC BLOOD PRESSURE: 56 MMHG

## 2021-07-01 VITALS
BODY MASS INDEX: 25.61 KG/M2 | TEMPERATURE: 97.6 F | DIASTOLIC BLOOD PRESSURE: 54 MMHG | HEART RATE: 62 BPM | WEIGHT: 150 LBS | HEIGHT: 64 IN | SYSTOLIC BLOOD PRESSURE: 146 MMHG

## 2021-07-01 VITALS
BODY MASS INDEX: 25.16 KG/M2 | TEMPERATURE: 97.5 F | HEART RATE: 56 BPM | DIASTOLIC BLOOD PRESSURE: 60 MMHG | SYSTOLIC BLOOD PRESSURE: 144 MMHG | WEIGHT: 147.4 LBS | HEIGHT: 64 IN

## 2021-07-02 VITALS
BODY MASS INDEX: 25.3 KG/M2 | HEART RATE: 56 BPM | HEIGHT: 64 IN | SYSTOLIC BLOOD PRESSURE: 128 MMHG | WEIGHT: 148.2 LBS | TEMPERATURE: 97.3 F | DIASTOLIC BLOOD PRESSURE: 52 MMHG